# Patient Record
Sex: FEMALE | Race: WHITE | NOT HISPANIC OR LATINO | URBAN - METROPOLITAN AREA
[De-identification: names, ages, dates, MRNs, and addresses within clinical notes are randomized per-mention and may not be internally consistent; named-entity substitution may affect disease eponyms.]

---

## 2019-08-04 ENCOUNTER — EMERGENCY (EMERGENCY)
Facility: HOSPITAL | Age: 34
LOS: 1 days | Discharge: HOME | End: 2019-08-04
Attending: EMERGENCY MEDICINE | Admitting: EMERGENCY MEDICINE
Payer: COMMERCIAL

## 2019-08-04 VITALS
RESPIRATION RATE: 18 BRPM | OXYGEN SATURATION: 97 % | HEART RATE: 84 BPM | TEMPERATURE: 98 F | HEIGHT: 62 IN | SYSTOLIC BLOOD PRESSURE: 122 MMHG | DIASTOLIC BLOOD PRESSURE: 85 MMHG | WEIGHT: 160.06 LBS

## 2019-08-04 DIAGNOSIS — Z98.89 OTHER SPECIFIED POSTPROCEDURAL STATES: Chronic | ICD-10-CM

## 2019-08-04 PROCEDURE — 70450 CT HEAD/BRAIN W/O DYE: CPT | Mod: 26

## 2019-08-04 PROCEDURE — 99284 EMERGENCY DEPT VISIT MOD MDM: CPT

## 2019-08-04 PROCEDURE — 70450 CT HEAD/BRAIN W/O DYE: CPT

## 2019-08-04 PROCEDURE — 99284 EMERGENCY DEPT VISIT MOD MDM: CPT | Mod: 25

## 2019-08-04 NOTE — ED ADULT NURSE REASSESSMENT NOTE - NS ED NURSE REASSESS COMMENT FT1
Steady gait noted leaving the unit.  Patient states her brother is picking her up.  Patient is speaking in clear, full sentences and maintaining saliva.  NAD.  Patient is ok for discharge.

## 2019-08-04 NOTE — ED ADULT NURSE NOTE - CHIEF COMPLAINT QUOTE
BIBEMS after having a couple of drinks and falling from 2 steps down, no LOC, hit her head. Denies any pain

## 2019-08-04 NOTE — ED ADULT NURSE NOTE - OBJECTIVE STATEMENT
Patient with history of asthma and GERD BIBA with c/o fall s/p Patient with history of asthma and GERD BIBA with c/o witnessed fall after having a couple of drinks with one episode of vomiting.  +LOC.  Patient denies CP, SOB, changes in vision, numbness, tingling, difficulty ambulating or neck pain.  Denies pain.

## 2019-08-04 NOTE — ED PROVIDER NOTE - OBJECTIVE STATEMENT
35yo femliu bib ems s/p fall. pt states she was drinking today and lost her balance down a few steps and hit her head, +LOC, witnessed by friends, no dizziness, +abrasions to knee, pt did vomit afterwards, no other compalints

## 2019-10-12 ENCOUNTER — TRANSCRIPTION ENCOUNTER (OUTPATIENT)
Age: 34
End: 2019-10-12

## 2019-12-24 ENCOUNTER — TRANSCRIPTION ENCOUNTER (OUTPATIENT)
Age: 34
End: 2019-12-24

## 2020-12-11 NOTE — ED ADULT TRIAGE NOTE - CHIEF COMPLAINT QUOTE
BIBEMS after having a couple of drinks and falling from 2 steps down, no LOC, hit her head. Denies any pain maintain upright posture during/after eating for 30 mins/oral hygiene/position upright (90 degrees)

## 2021-04-22 ENCOUNTER — TRANSCRIPTION ENCOUNTER (OUTPATIENT)
Age: 36
End: 2021-04-22

## 2021-04-23 ENCOUNTER — RESULT REVIEW (OUTPATIENT)
Age: 36
End: 2021-04-23

## 2021-04-23 ENCOUNTER — INPATIENT (INPATIENT)
Facility: HOSPITAL | Age: 36
LOS: 0 days | Discharge: HOME | DRG: 343 | End: 2021-04-23
Attending: GENERAL PRACTICE | Admitting: GENERAL PRACTICE
Payer: COMMERCIAL

## 2021-04-23 VITALS
RESPIRATION RATE: 15 BRPM | OXYGEN SATURATION: 96 % | HEART RATE: 61 BPM | SYSTOLIC BLOOD PRESSURE: 112 MMHG | DIASTOLIC BLOOD PRESSURE: 70 MMHG

## 2021-04-23 VITALS
TEMPERATURE: 98 F | SYSTOLIC BLOOD PRESSURE: 131 MMHG | HEART RATE: 94 BPM | OXYGEN SATURATION: 98 % | WEIGHT: 158.07 LBS | RESPIRATION RATE: 18 BRPM | HEIGHT: 62 IN | DIASTOLIC BLOOD PRESSURE: 87 MMHG

## 2021-04-23 DIAGNOSIS — Z98.89 OTHER SPECIFIED POSTPROCEDURAL STATES: Chronic | ICD-10-CM

## 2021-04-23 DIAGNOSIS — K37 UNSPECIFIED APPENDICITIS: ICD-10-CM

## 2021-04-23 LAB
ALBUMIN SERPL ELPH-MCNC: 3.7 G/DL — SIGNIFICANT CHANGE UP (ref 3.3–5)
ALP SERPL-CCNC: 55 U/L — SIGNIFICANT CHANGE UP (ref 40–120)
ALT FLD-CCNC: 15 U/L — SIGNIFICANT CHANGE UP (ref 12–78)
ANION GAP SERPL CALC-SCNC: 6 MMOL/L — SIGNIFICANT CHANGE UP (ref 5–17)
APPEARANCE UR: CLEAR — SIGNIFICANT CHANGE UP
APTT BLD: 29.5 SEC — SIGNIFICANT CHANGE UP (ref 27.5–35.5)
AST SERPL-CCNC: 8 U/L — LOW (ref 15–37)
BASOPHILS # BLD AUTO: 0.03 K/UL — SIGNIFICANT CHANGE UP (ref 0–0.2)
BASOPHILS NFR BLD AUTO: 0.3 % — SIGNIFICANT CHANGE UP (ref 0–2)
BILIRUB SERPL-MCNC: 0.4 MG/DL — SIGNIFICANT CHANGE UP (ref 0.2–1.2)
BILIRUB UR-MCNC: NEGATIVE — SIGNIFICANT CHANGE UP
BLD GP AB SCN SERPL QL: SIGNIFICANT CHANGE UP
BUN SERPL-MCNC: 9 MG/DL — SIGNIFICANT CHANGE UP (ref 7–23)
CALCIUM SERPL-MCNC: 8.7 MG/DL — SIGNIFICANT CHANGE UP (ref 8.5–10.1)
CHLORIDE SERPL-SCNC: 105 MMOL/L — SIGNIFICANT CHANGE UP (ref 96–108)
CO2 SERPL-SCNC: 27 MMOL/L — SIGNIFICANT CHANGE UP (ref 22–31)
COLOR SPEC: SIGNIFICANT CHANGE UP
CREAT SERPL-MCNC: 0.61 MG/DL — SIGNIFICANT CHANGE UP (ref 0.5–1.3)
DIFF PNL FLD: NEGATIVE — SIGNIFICANT CHANGE UP
EOSINOPHIL # BLD AUTO: 0.36 K/UL — SIGNIFICANT CHANGE UP (ref 0–0.5)
EOSINOPHIL NFR BLD AUTO: 4.1 % — SIGNIFICANT CHANGE UP (ref 0–6)
GLUCOSE SERPL-MCNC: 92 MG/DL — SIGNIFICANT CHANGE UP (ref 70–99)
GLUCOSE UR QL: NEGATIVE — SIGNIFICANT CHANGE UP
HCG UR QL: NEGATIVE — SIGNIFICANT CHANGE UP
HCT VFR BLD CALC: 36.4 % — SIGNIFICANT CHANGE UP (ref 34.5–45)
HGB BLD-MCNC: 12.7 G/DL — SIGNIFICANT CHANGE UP (ref 11.5–15.5)
IMM GRANULOCYTES NFR BLD AUTO: 0.3 % — SIGNIFICANT CHANGE UP (ref 0–1.5)
INR BLD: 1.11 RATIO — SIGNIFICANT CHANGE UP (ref 0.88–1.16)
KETONES UR-MCNC: NEGATIVE — SIGNIFICANT CHANGE UP
LEUKOCYTE ESTERASE UR-ACNC: NEGATIVE — SIGNIFICANT CHANGE UP
LIDOCAIN IGE QN: 60 U/L — LOW (ref 73–393)
LYMPHOCYTES # BLD AUTO: 1.45 K/UL — SIGNIFICANT CHANGE UP (ref 1–3.3)
LYMPHOCYTES # BLD AUTO: 16.6 % — SIGNIFICANT CHANGE UP (ref 13–44)
MCHC RBC-ENTMCNC: 30.8 PG — SIGNIFICANT CHANGE UP (ref 27–34)
MCHC RBC-ENTMCNC: 34.9 GM/DL — SIGNIFICANT CHANGE UP (ref 32–36)
MCV RBC AUTO: 88.3 FL — SIGNIFICANT CHANGE UP (ref 80–100)
MONOCYTES # BLD AUTO: 0.96 K/UL — HIGH (ref 0–0.9)
MONOCYTES NFR BLD AUTO: 11 % — SIGNIFICANT CHANGE UP (ref 2–14)
NEUTROPHILS # BLD AUTO: 5.88 K/UL — SIGNIFICANT CHANGE UP (ref 1.8–7.4)
NEUTROPHILS NFR BLD AUTO: 67.7 % — SIGNIFICANT CHANGE UP (ref 43–77)
NITRITE UR-MCNC: NEGATIVE — SIGNIFICANT CHANGE UP
NRBC # BLD: 0 /100 WBCS — SIGNIFICANT CHANGE UP (ref 0–0)
PH UR: 6 — SIGNIFICANT CHANGE UP (ref 5–8)
PLATELET # BLD AUTO: 279 K/UL — SIGNIFICANT CHANGE UP (ref 150–400)
POTASSIUM SERPL-MCNC: 4 MMOL/L — SIGNIFICANT CHANGE UP (ref 3.5–5.3)
POTASSIUM SERPL-SCNC: 4 MMOL/L — SIGNIFICANT CHANGE UP (ref 3.5–5.3)
PROT SERPL-MCNC: 7.8 G/DL — SIGNIFICANT CHANGE UP (ref 6–8.3)
PROT UR-MCNC: NEGATIVE — SIGNIFICANT CHANGE UP
PROTHROM AB SERPL-ACNC: 12.9 SEC — SIGNIFICANT CHANGE UP (ref 10.6–13.6)
RBC # BLD: 4.12 M/UL — SIGNIFICANT CHANGE UP (ref 3.8–5.2)
RBC # FLD: 12.3 % — SIGNIFICANT CHANGE UP (ref 10.3–14.5)
SARS-COV-2 RNA SPEC QL NAA+PROBE: SIGNIFICANT CHANGE UP
SODIUM SERPL-SCNC: 138 MMOL/L — SIGNIFICANT CHANGE UP (ref 135–145)
SP GR SPEC: 1.01 — SIGNIFICANT CHANGE UP (ref 1.01–1.02)
UROBILINOGEN FLD QL: NEGATIVE — SIGNIFICANT CHANGE UP
WBC # BLD: 8.71 K/UL — SIGNIFICANT CHANGE UP (ref 3.8–10.5)
WBC # FLD AUTO: 8.71 K/UL — SIGNIFICANT CHANGE UP (ref 3.8–10.5)

## 2021-04-23 PROCEDURE — 36415 COLL VENOUS BLD VENIPUNCTURE: CPT

## 2021-04-23 PROCEDURE — 88304 TISSUE EXAM BY PATHOLOGIST: CPT | Mod: 26

## 2021-04-23 PROCEDURE — 81025 URINE PREGNANCY TEST: CPT

## 2021-04-23 PROCEDURE — 99285 EMERGENCY DEPT VISIT HI MDM: CPT

## 2021-04-23 PROCEDURE — 86900 BLOOD TYPING SEROLOGIC ABO: CPT

## 2021-04-23 PROCEDURE — 74177 CT ABD & PELVIS W/CONTRAST: CPT

## 2021-04-23 PROCEDURE — U0003: CPT

## 2021-04-23 PROCEDURE — 85610 PROTHROMBIN TIME: CPT

## 2021-04-23 PROCEDURE — C1889: CPT

## 2021-04-23 PROCEDURE — U0005: CPT

## 2021-04-23 PROCEDURE — 85730 THROMBOPLASTIN TIME PARTIAL: CPT

## 2021-04-23 PROCEDURE — 81003 URINALYSIS AUTO W/O SCOPE: CPT

## 2021-04-23 PROCEDURE — 86901 BLOOD TYPING SEROLOGIC RH(D): CPT

## 2021-04-23 PROCEDURE — 74177 CT ABD & PELVIS W/CONTRAST: CPT | Mod: 26,MA

## 2021-04-23 PROCEDURE — 83690 ASSAY OF LIPASE: CPT

## 2021-04-23 PROCEDURE — 85025 COMPLETE CBC W/AUTO DIFF WBC: CPT

## 2021-04-23 PROCEDURE — 96374 THER/PROPH/DIAG INJ IV PUSH: CPT

## 2021-04-23 PROCEDURE — 86850 RBC ANTIBODY SCREEN: CPT

## 2021-04-23 PROCEDURE — 88304 TISSUE EXAM BY PATHOLOGIST: CPT

## 2021-04-23 PROCEDURE — 80053 COMPREHEN METABOLIC PANEL: CPT

## 2021-04-23 RX ORDER — METOCLOPRAMIDE HCL 10 MG
10 TABLET ORAL ONCE
Refills: 0 | Status: COMPLETED | OUTPATIENT
Start: 2021-04-23 | End: 2021-04-23

## 2021-04-23 RX ORDER — HYDROMORPHONE HYDROCHLORIDE 2 MG/ML
1 INJECTION INTRAMUSCULAR; INTRAVENOUS; SUBCUTANEOUS
Refills: 0 | Status: DISCONTINUED | OUTPATIENT
Start: 2021-04-23 | End: 2021-04-23

## 2021-04-23 RX ORDER — SODIUM CHLORIDE 9 MG/ML
1000 INJECTION, SOLUTION INTRAVENOUS
Refills: 0 | Status: DISCONTINUED | OUTPATIENT
Start: 2021-04-23 | End: 2021-04-23

## 2021-04-23 RX ORDER — DIPHENHYDRAMINE HCL 50 MG
25 CAPSULE ORAL ONCE
Refills: 0 | Status: COMPLETED | OUTPATIENT
Start: 2021-04-23 | End: 2021-04-23

## 2021-04-23 RX ORDER — HYDROMORPHONE HYDROCHLORIDE 2 MG/ML
0.5 INJECTION INTRAMUSCULAR; INTRAVENOUS; SUBCUTANEOUS
Refills: 0 | Status: DISCONTINUED | OUTPATIENT
Start: 2021-04-23 | End: 2021-04-23

## 2021-04-23 RX ORDER — FAMOTIDINE 10 MG/ML
20 INJECTION INTRAVENOUS ONCE
Refills: 0 | Status: COMPLETED | OUTPATIENT
Start: 2021-04-23 | End: 2021-04-23

## 2021-04-23 RX ORDER — ONDANSETRON 8 MG/1
4 TABLET, FILM COATED ORAL ONCE
Refills: 0 | Status: DISCONTINUED | OUTPATIENT
Start: 2021-04-23 | End: 2021-04-23

## 2021-04-23 RX ORDER — SODIUM CHLORIDE 9 MG/ML
500 INJECTION, SOLUTION INTRAVENOUS
Refills: 0 | Status: DISCONTINUED | OUTPATIENT
Start: 2021-04-23 | End: 2021-04-23

## 2021-04-23 RX ORDER — SODIUM CHLORIDE 9 MG/ML
1000 INJECTION INTRAMUSCULAR; INTRAVENOUS; SUBCUTANEOUS ONCE
Refills: 0 | Status: COMPLETED | OUTPATIENT
Start: 2021-04-23 | End: 2021-04-23

## 2021-04-23 RX ORDER — MORPHINE SULFATE 50 MG/1
2 CAPSULE, EXTENDED RELEASE ORAL ONCE
Refills: 0 | Status: DISCONTINUED | OUTPATIENT
Start: 2021-04-23 | End: 2021-04-23

## 2021-04-23 RX ORDER — APREPITANT 80 MG/1
40 CAPSULE ORAL ONCE
Refills: 0 | Status: DISCONTINUED | OUTPATIENT
Start: 2021-04-23 | End: 2021-04-23

## 2021-04-23 RX ORDER — MORPHINE SULFATE 50 MG/1
4 CAPSULE, EXTENDED RELEASE ORAL ONCE
Refills: 0 | Status: DISCONTINUED | OUTPATIENT
Start: 2021-04-23 | End: 2021-04-23

## 2021-04-23 RX ORDER — CEFOTETAN DISODIUM 1 G
2 VIAL (EA) INJECTION ONCE
Refills: 0 | Status: COMPLETED | OUTPATIENT
Start: 2021-04-23 | End: 2021-04-23

## 2021-04-23 RX ORDER — IBUPROFEN 200 MG
400 TABLET ORAL EVERY 8 HOURS
Refills: 0 | Status: DISCONTINUED | OUTPATIENT
Start: 2021-04-23 | End: 2021-04-23

## 2021-04-23 RX ADMIN — MORPHINE SULFATE 2 MILLIGRAM(S): 50 CAPSULE, EXTENDED RELEASE ORAL at 14:49

## 2021-04-23 RX ADMIN — Medication 30 MILLILITER(S): at 08:43

## 2021-04-23 RX ADMIN — SODIUM CHLORIDE 1000 MILLILITER(S): 9 INJECTION INTRAMUSCULAR; INTRAVENOUS; SUBCUTANEOUS at 13:04

## 2021-04-23 RX ADMIN — SODIUM CHLORIDE 1000 MILLILITER(S): 9 INJECTION INTRAMUSCULAR; INTRAVENOUS; SUBCUTANEOUS at 08:44

## 2021-04-23 RX ADMIN — FAMOTIDINE 20 MILLIGRAM(S): 10 INJECTION INTRAVENOUS at 08:43

## 2021-04-23 RX ADMIN — Medication 25 MILLIGRAM(S): at 15:10

## 2021-04-23 RX ADMIN — MORPHINE SULFATE 4 MILLIGRAM(S): 50 CAPSULE, EXTENDED RELEASE ORAL at 13:04

## 2021-04-23 RX ADMIN — Medication 100 GRAM(S): at 14:50

## 2021-04-23 RX ADMIN — Medication 10 MILLIGRAM(S): at 21:20

## 2021-04-23 NOTE — ED PROVIDER NOTE - PHYSICAL EXAMINATION
General:  Comfortable, no acute distress.  Head:  Normocephalic.  Eyes:  Conjunctiva pink, no icterus.  Ear, Nose, Mouth, Throat:  No obvious congestion.  Cardiovascular:  Regular rate, no obvious murmur.  Respiratory:  Clear to auscultation with good air entry bilaterally.  Abdomen:  Soft, non-distended. No rebound, Richardson's negative. + McBurney's. voluntary guarding.  Urologic: No CVA tenderness.  Musculoskeletal:  No deformity, edema, or calf tenderness.  Neurologic: Alert and oriented, gross motor and sensory intact.  Skin:  Warm and dry.  Psychiatric: Normal affect.

## 2021-04-23 NOTE — ED ADULT NURSE NOTE - NSIMPLEMENTINTERV_GEN_ALL_ED
Implemented All Universal Safety Interventions:  [place] to call system. Call bell, personal items and telephone within reach. Instruct patient to call for assistance. Room bathroom lighting operational. Non-slip footwear when patient is off stretcher. Physically safe environment: no spills, clutter or unnecessary equipment. Stretcher in lowest position, wheels locked, appropriate side rails in place.

## 2021-04-23 NOTE — ED PROVIDER NOTE - OBJECTIVE STATEMENT
36 F no PMH here complaining of abdominal pain, started a few days ago and has been worsening. Started in upper abdomen, now in lower, pain is sharp like punching sensation. No nausea, vomiting, fevers, chills, constipation, diarrhea, dysuria. nothing taken prior to arrival, no hx of similar symptoms. no inciting event.

## 2021-04-23 NOTE — H&P ADULT - HISTORY OF PRESENT ILLNESS
35 yo fem c/o diffuse abdominal pain which started 3 days ago, her pain migrated to RLQ last night, pain 8/10, not relieved by anything, decreased appetite, no fever. CT Abd showed thickened appendix consistent with appendicitis.

## 2021-04-23 NOTE — ED ADULT NURSE REASSESSMENT NOTE - NS ED NURSE REASSESS COMMENT FT1
Patient had allergic reaction to the ABX CefoTtan ppt had hives and itching on chest patient denies swelling of throat or breathing difficulties MD made aware Benadryl give IVP will continue to monitor

## 2021-04-23 NOTE — CONSULT NOTE ADULT - SUBJECTIVE AND OBJECTIVE BOX
[facility]  [place] [address] [phone]      Chief Complaint:  Patient is a 36y old  Female who presents with a chief complaint of     HPI:36 F no PMH here complaining of abdominal pain, started a few days ago and has been worsening. Started in upper abdomen, now in lower, pain is sharp like punching sensation. No nausea, vomiting, fevers, chills, constipation, diarrhea, dysuria. nothing taken prior to arrival, no hx of similar symptoms. no inciting event    Allergies:  No Known Allergies      Medications: reviewed      PMHX/PSHX:  GERD (gastroesophageal reflux disease)    Asthma    S/P tonsillectomy        Family history:  htn    Social History: no toxic habits     ROS:     General:  No wt loss, fevers, chills, night sweats, fatigue,   Eyes:  Good vision, no reported pain  ENT:  No sore throat, pain, runny nose, dysphagia  CV:  No pain, palpitations, hypo/hypertension  Resp:  No dyspnea, cough, tachypnea, wheezing  GI:  + pain, No nausea, No vomiting, No diarrhea, No constipation, No weight loss, No fever, No pruritis, No rectal bleeding, No tarry stools, No dysphagia,  :  No pain, bleeding, incontinence, nocturia  Muscle:  No pain, weakness  Neuro:  No weakness, tingling, memory problems  Psych:  No fatigue, insomnia, mood problems, depression  Endocrine:  No polyuria, polydipsia, cold/heat intolerance  Heme:  No petechiae, ecchymosis, easy bruisability  Skin:  No rash, tattoos, scars, edema      PHYSICAL EXAM:   Vital Signs:  Vital Signs Last 24 Hrs  T(C): 37.2 (2021 11:10), Max: 37.2 (2021 11:10)  T(F): 99 (2021 11:10), Max: 99 (2021 11:10)  HR: 81 (2021 11:10) (81 - 94)  BP: 123/79 (2021 11:10) (123/79 - 131/87)  BP(mean): --  RR: 16 (2021 11:10) (16 - 18)  SpO2: 100% (2021 11:10) (98% - 100%)  Daily Height in cm: 157.48 (2021 08:12)    Daily     GENERAL:  Appears stated age,   HEENT:  NC/AT,    CHEST:  Full & symmetric excursion,   HEART:  Regular rhythm  ABDOMEN:  Soft, non-tender, non-distended,   EXTEREMITIES:  no cyanosis,clubbing or edema  SKIN:  No rash  NEURO:  Alert,    LABS:                        12.7   8.71  )-----------( 279      ( 2021 08:40 )             36.4     04    138  |  105  |  9   ----------------------------<  92  4.0   |  27  |  0.61    Ca    8.7      2021 08:40    TPro  7.8  /  Alb  3.7  /  TBili  0.4  /  DBili  x   /  AST  8<L>  /  ALT  15  /  AlkPhos  55  0423    LIVER FUNCTIONS - ( 2021 08:40 )  Alb: 3.7 g/dL / Pro: 7.8 g/dL / ALK PHOS: 55 U/L / ALT: 15 U/L / AST: 8 U/L / GGT: x             Urinalysis Basic - ( 2021 09:25 )    Color: Pale Yellow / Appearance: Clear / S.010 / pH: x  Gluc: x / Ketone: Negative  / Bili: Negative / Urobili: Negative   Blood: x / Protein: Negative / Nitrite: Negative   Leuk Esterase: Negative / RBC: x / WBC x   Sq Epi: x / Non Sq Epi: x / Bacteria: x      Amylase Serum--      Lipase serum60       Ammonia--      Imaging:

## 2021-04-23 NOTE — H&P ADULT - NSHPREVIEWOFSYSTEMS_GEN_ALL_CORE
REVIEW OF SYSTEMS:    CONSTITUTIONAL: No weakness, fevers or chills  EYES/ENT: No visual changes;  No vertigo or throat pain   NECK: No pain or stiffness  RESPIRATORY: No cough, wheezing, hemoptysis; No shortness of breath  CARDIOVASCULAR: No chest pain or palpitations  GASTROINTESTINAL: abdominal  pain. No nausea, vomiting, or hematemesis; No diarrhea or constipation. No melena or hematochezia.  GENITOURINARY: No dysuria, frequency or hematuria  NEUROLOGICAL: No numbness or weakness  SKIN: No itching, burning, rashes, or lesions   All other review of systems is negative unless indicated above.

## 2021-04-23 NOTE — ED PROVIDER NOTE - PROGRESS NOTE DETAILS
patient reports pain now more intense, localizing more in right lower quadrant. CT with concern for early appy. Dr. Pichardo called. Spoke with Dr. Picharod, he recommends admission to his service, he will decide on antibiotics later.

## 2021-04-23 NOTE — H&P ADULT - NSHPLABSRESULTS_GEN_ALL_CORE
Vital Signs Last 24 Hrs  T(C): 37.2 (23 Apr 2021 11:10), Max: 37.2 (23 Apr 2021 11:10)  T(F): 99 (23 Apr 2021 11:10), Max: 99 (23 Apr 2021 11:10)  HR: 81 (23 Apr 2021 11:10) (81 - 94)  BP: 123/79 (23 Apr 2021 11:10) (123/79 - 131/87)  BP(mean): --  RR: 16 (23 Apr 2021 11:10) (16 - 18)  SpO2: 100% (23 Apr 2021 11:10) (98% - 100%)                          12.7   8.71  )-----------( 279      ( 23 Apr 2021 08:40 )             36.4       04-23    138  |  105  |  9   ----------------------------<  92  4.0   |  27  |  0.61    Ca    8.7      23 Apr 2021 08:40    TPro  7.8  /  Alb  3.7  /  TBili  0.4  /  DBili  x   /  AST  8<L>  /  ALT  15  /  AlkPhos  55  04-23      LIVER FUNCTIONS - ( 23 Apr 2021 08:40 )  Alb: 3.7 g/dL / Pro: 7.8 g/dL / ALK PHOS: 55 U/L / ALT: 15 U/L / AST: 8 U/L / GGT: x             MEDICATIONS  (STANDING):    MEDICATIONS  (PRN):      MEDICATIONS  (STANDING):      < from: CT Abdomen and Pelvis w/ IV Cont (04.23.21 @ 11:56) >    FINDINGS:  LOWER CHEST: A few small lung nodules bilaterally with the largest measuring up to 5 mm in the right lower lobe (image 18 series 2). If the patient is in the high risk category (i.e. smoker), follow-up chest CT may be pursued in 12 months to ensure stability.    LIVER: Small hypodense lesion in the right hepatic lobe, too small to characterize.  BILE DUCTS: Normal caliber.  GALLBLADDER: Within normal limits.  SPLEEN: Within normal limits.  PANCREAS: Within normal limits.  ADRENALS: Within normal limits.  KIDNEYS/URETERS: Unremarkable except for a small hypodense lesion in the right kidney, too small to characterize.    BLADDER: Within normal limits.  REPRODUCTIVE ORGANS: The uterus appears grossly unremarkable. 2.9 cm hypodense structure in the left adnexa may represent a complex left ovarian cyst. If clinically indicated, pelvic ultrasound may be pursued for further evaluation.    BOWEL: No bowel obstruction. Appendix measures 8 mm in diameter which is slightly thickened without adjacent stranding. Findings are equivocal for acute appendicitis.  PERITONEUM: No free air or ascites.  VESSELS: Within normal limits.  RETROPERITONEUM/LYMPH NODES: No lymphadenopathy.  ABDOMINAL WALL: Within normal limits.  BONES: Within normal limits.    IMPRESSION:  Appendix measures 8 mm in diameter which is slightly thickened without adjacent stranding. Findings are equivocal for acute appendicitis. Clinical correlation is recommended.    2.9 cm hypodense structure in the left adnexa may represent a complex left ovarian cyst. If clinically indicated, pelvic ultrasound may be pursued for further evaluation.    A few small lung nodules bilaterally. If the patient is in the high risk category (i.e. smoker), follow-up chest CT may be pursued in 12 months to ensure stability.    < end of copied text >

## 2021-04-23 NOTE — BRIEF OPERATIVE NOTE - NSICDXBRIEFPREOP_GEN_ALL_CORE_FT
PRE-OP DIAGNOSIS:  Acute appendicitis with localized peritonitis 23-Apr-2021 20:49:50  Eusebio Schwartz

## 2021-04-23 NOTE — H&P ADULT - NSHPPHYSICALEXAM_GEN_ALL_CORE
.  VITAL SIGNS:  T(C): 37.2 (04-23-21 @ 11:10), Max: 37.2 (04-23-21 @ 11:10)  T(F): 99 (04-23-21 @ 11:10), Max: 99 (04-23-21 @ 11:10)  HR: 81 (04-23-21 @ 11:10) (81 - 94)  BP: 123/79 (04-23-21 @ 11:10) (123/79 - 131/87)  BP(mean): --  RR: 16 (04-23-21 @ 11:10) (16 - 18)  SpO2: 100% (04-23-21 @ 11:10) (98% - 100%)  Wt(kg): --    PHYSICAL EXAM:    Constitutional: resting comfortably in bed; NAD  Eyes: PERRL, EOMI, anicteric sclera  ENT: no nasal discharge; uvula midline, no oropharyngeal erythema or exudates  Neck: supple; no JVD or thyromegaly  Respiratory: CTA B/L; no W/R/R, no retractions  Cardiac: +S1/S2; RRR; no murmurs  Gastrointestinal: RLQ tenderness, rebound  Back: spine midline, no bony tenderness or step-offs; no CVAT B/L  Extremities: no clubbing or cyanosis; no peripheral edema  Musculoskeletal: NROM x4; no joint swelling, tenderness or erythema  Vascular: 2+ radial, femoral, DP/PT pulses B/L  Dermatologic: skin warm, dry and intact; no rashes, wounds, or scars  Lymphatic: no submandibular or cervical LAD  Neurologic: AAOx3; CNII-XII grossly intact; no focal deficits  Psychiatric: affect and characteristics of appearance, verbalizations, behaviors are appropriate

## 2021-04-23 NOTE — ASU DISCHARGE PLAN (ADULT/PEDIATRIC) - CARE PROVIDER_API CALL
Eusebio Schwartz)  ColonRectal Surgery; Surgery  [address] [place] [address]  Phone: [phone]  Fax: [phone]  Follow Up Time:

## 2021-04-23 NOTE — BRIEF OPERATIVE NOTE - NSICDXBRIEFPOSTOP_GEN_ALL_CORE_FT
POST-OP DIAGNOSIS:  Acute appendicitis with localized peritonitis 23-Apr-2021 20:49:58  Eusebio Schwartz

## 2021-04-23 NOTE — CONSULT NOTE ADULT - ASSESSMENT
abdominal pain  abnormal CT    NPO  iv fluid  CT reviewed ? early appendicitis  it correlates with her symptoms  surgical eval  d/w ER

## 2021-04-23 NOTE — ED ADULT NURSE NOTE - OBJECTIVE STATEMENT
Patient is a 35yo female complaining of right lower abdominal pain since Tuesday Patient denies nausea vomiting diarrhea. Patient states that she had this pain before and it was due to gas, but this time it feel more intense. Patient denies GI or  PMH

## 2022-07-28 ENCOUNTER — EMERGENCY (EMERGENCY)
Facility: HOSPITAL | Age: 37
LOS: 1 days | Discharge: HOME | End: 2022-07-28
Attending: STUDENT IN AN ORGANIZED HEALTH CARE EDUCATION/TRAINING PROGRAM | Admitting: STUDENT IN AN ORGANIZED HEALTH CARE EDUCATION/TRAINING PROGRAM

## 2022-07-28 VITALS
TEMPERATURE: 97 F | RESPIRATION RATE: 18 BRPM | SYSTOLIC BLOOD PRESSURE: 132 MMHG | DIASTOLIC BLOOD PRESSURE: 79 MMHG | HEART RATE: 89 BPM | OXYGEN SATURATION: 100 % | HEIGHT: 62 IN

## 2022-07-28 VITALS
SYSTOLIC BLOOD PRESSURE: 123 MMHG | OXYGEN SATURATION: 100 % | TEMPERATURE: 98 F | DIASTOLIC BLOOD PRESSURE: 78 MMHG | HEART RATE: 88 BPM | RESPIRATION RATE: 16 BRPM

## 2022-07-28 DIAGNOSIS — Z90.49 ACQUIRED ABSENCE OF OTHER SPECIFIED PARTS OF DIGESTIVE TRACT: Chronic | ICD-10-CM

## 2022-07-28 DIAGNOSIS — Z98.89 OTHER SPECIFIED POSTPROCEDURAL STATES: Chronic | ICD-10-CM

## 2022-07-28 LAB
ALBUMIN SERPL ELPH-MCNC: 3.8 G/DL — SIGNIFICANT CHANGE UP (ref 3.3–5)
ALP SERPL-CCNC: 48 U/L — SIGNIFICANT CHANGE UP (ref 40–120)
ALT FLD-CCNC: 6 U/L — SIGNIFICANT CHANGE UP (ref 4–33)
ANION GAP SERPL CALC-SCNC: 9 MMOL/L — SIGNIFICANT CHANGE UP (ref 7–14)
APPEARANCE UR: ABNORMAL
AST SERPL-CCNC: 13 U/L — SIGNIFICANT CHANGE UP (ref 4–32)
BACTERIA # UR AUTO: ABNORMAL
BASOPHILS # BLD AUTO: 0.02 K/UL — SIGNIFICANT CHANGE UP (ref 0–0.2)
BASOPHILS NFR BLD AUTO: 0.3 % — SIGNIFICANT CHANGE UP (ref 0–2)
BILIRUB SERPL-MCNC: <0.2 MG/DL — SIGNIFICANT CHANGE UP (ref 0.2–1.2)
BILIRUB UR-MCNC: NEGATIVE — SIGNIFICANT CHANGE UP
BUN SERPL-MCNC: 5 MG/DL — LOW (ref 7–23)
CALCIUM SERPL-MCNC: 9.3 MG/DL — SIGNIFICANT CHANGE UP (ref 8.4–10.5)
CHLORIDE SERPL-SCNC: 103 MMOL/L — SIGNIFICANT CHANGE UP (ref 98–107)
CO2 SERPL-SCNC: 24 MMOL/L — SIGNIFICANT CHANGE UP (ref 22–31)
COLOR SPEC: COLORLESS — SIGNIFICANT CHANGE UP
CREAT SERPL-MCNC: 0.46 MG/DL — LOW (ref 0.5–1.3)
DIFF PNL FLD: NEGATIVE — SIGNIFICANT CHANGE UP
EGFR: 126 ML/MIN/1.73M2 — SIGNIFICANT CHANGE UP
EOSINOPHIL # BLD AUTO: 0.26 K/UL — SIGNIFICANT CHANGE UP (ref 0–0.5)
EOSINOPHIL NFR BLD AUTO: 3.8 % — SIGNIFICANT CHANGE UP (ref 0–6)
EPI CELLS # UR: ABNORMAL
GLUCOSE SERPL-MCNC: 97 MG/DL — SIGNIFICANT CHANGE UP (ref 70–99)
GLUCOSE UR QL: ABNORMAL
HCT VFR BLD CALC: 34.2 % — LOW (ref 34.5–45)
HGB BLD-MCNC: 11.7 G/DL — SIGNIFICANT CHANGE UP (ref 11.5–15.5)
HYALINE CASTS # UR AUTO: 0 /LPF — SIGNIFICANT CHANGE UP (ref 0–7)
IANC: 4.34 K/UL — SIGNIFICANT CHANGE UP (ref 1.8–7.4)
IMM GRANULOCYTES NFR BLD AUTO: 0.3 % — SIGNIFICANT CHANGE UP (ref 0–1.5)
KETONES UR-MCNC: NEGATIVE — SIGNIFICANT CHANGE UP
LEUKOCYTE ESTERASE UR-ACNC: ABNORMAL
LYMPHOCYTES # BLD AUTO: 1.65 K/UL — SIGNIFICANT CHANGE UP (ref 1–3.3)
LYMPHOCYTES # BLD AUTO: 23.8 % — SIGNIFICANT CHANGE UP (ref 13–44)
MCHC RBC-ENTMCNC: 30.5 PG — SIGNIFICANT CHANGE UP (ref 27–34)
MCHC RBC-ENTMCNC: 34.2 GM/DL — SIGNIFICANT CHANGE UP (ref 32–36)
MCV RBC AUTO: 89.3 FL — SIGNIFICANT CHANGE UP (ref 80–100)
MONOCYTES # BLD AUTO: 0.64 K/UL — SIGNIFICANT CHANGE UP (ref 0–0.9)
MONOCYTES NFR BLD AUTO: 9.2 % — SIGNIFICANT CHANGE UP (ref 2–14)
NEUTROPHILS # BLD AUTO: 4.34 K/UL — SIGNIFICANT CHANGE UP (ref 1.8–7.4)
NEUTROPHILS NFR BLD AUTO: 62.6 % — SIGNIFICANT CHANGE UP (ref 43–77)
NITRITE UR-MCNC: NEGATIVE — SIGNIFICANT CHANGE UP
NRBC # BLD: 0 /100 WBCS — SIGNIFICANT CHANGE UP
NRBC # FLD: 0 K/UL — SIGNIFICANT CHANGE UP
PH UR: 7.5 — SIGNIFICANT CHANGE UP (ref 5–8)
PLATELET # BLD AUTO: 241 K/UL — SIGNIFICANT CHANGE UP (ref 150–400)
POTASSIUM SERPL-MCNC: 4 MMOL/L — SIGNIFICANT CHANGE UP (ref 3.5–5.3)
POTASSIUM SERPL-SCNC: 4 MMOL/L — SIGNIFICANT CHANGE UP (ref 3.5–5.3)
PROT SERPL-MCNC: 7 G/DL — SIGNIFICANT CHANGE UP (ref 6–8.3)
PROT UR-MCNC: ABNORMAL
RBC # BLD: 3.83 M/UL — SIGNIFICANT CHANGE UP (ref 3.8–5.2)
RBC # FLD: 12.1 % — SIGNIFICANT CHANGE UP (ref 10.3–14.5)
RBC CASTS # UR COMP ASSIST: 2 /HPF — SIGNIFICANT CHANGE UP (ref 0–4)
SODIUM SERPL-SCNC: 136 MMOL/L — SIGNIFICANT CHANGE UP (ref 135–145)
SP GR SPEC: 1.01 — SIGNIFICANT CHANGE UP (ref 1–1.05)
UROBILINOGEN FLD QL: SIGNIFICANT CHANGE UP
WBC # BLD: 6.93 K/UL — SIGNIFICANT CHANGE UP (ref 3.8–10.5)
WBC # FLD AUTO: 6.93 K/UL — SIGNIFICANT CHANGE UP (ref 3.8–10.5)
WBC UR QL: 20 /HPF — SIGNIFICANT CHANGE UP (ref 0–5)

## 2022-07-28 PROCEDURE — 99284 EMERGENCY DEPT VISIT MOD MDM: CPT

## 2022-07-28 RX ORDER — NITROFURANTOIN MACROCRYSTAL 50 MG
1 CAPSULE ORAL
Qty: 10 | Refills: 0
Start: 2022-07-28 | End: 2022-08-01

## 2022-07-28 RX ORDER — MULTIVIT WITH MIN/MFOLATE/K2 340-15/3 G
1 POWDER (GRAM) ORAL ONCE
Refills: 0 | Status: COMPLETED | OUTPATIENT
Start: 2022-07-28 | End: 2022-07-28

## 2022-07-28 RX ORDER — NITROFURANTOIN MACROCRYSTAL 50 MG
100 CAPSULE ORAL ONCE
Refills: 0 | Status: COMPLETED | OUTPATIENT
Start: 2022-07-28 | End: 2022-07-28

## 2022-07-28 RX ADMIN — Medication 100 MILLIGRAM(S): at 18:11

## 2022-07-28 RX ADMIN — Medication 1 ENEMA: at 14:58

## 2022-07-28 RX ADMIN — Medication 1 BOTTLE: at 13:11

## 2022-07-28 NOTE — ED PROVIDER NOTE - PROGRESS NOTE DETAILS
GWENDOLYN Gutiérrez NP:  Pt received the enema and has been able to have a bowel movement.  She reports feeling a  bit clammy after but also states she feels relief and overall better.  She states she is waiting to pass urine as she has had difficulty with urinating.  Provided oral hydration to facilitate urination. GWENDOLYN Gutiérrez NP: UA results indicate possible UTI.  Pt informed of results and plan d/w pt.  Pt still having difficulty urinating.  Repeat bladder scan revealed ~600cc urine in bladder.  Will plan to straight cath patient and administer first dose of abx in ED.  Pt has appt with her OB/GYN tomorrow so will d/c to f/u w/ her OB.  All questions answered.  Pt stable and ready for d/c. Offered lopez - declines. States that she has issues with retention but is able to pee in the bathtub during this pregnancy. On med review no medications that would cause retention. Will DC w follow up tomorrow am. Gilmar Gupta, ED Attending GWENDOLYN Gutiérrez NP: Per RN, pt was straight cath - 800cc urine drained.  Pt once again offered lopez but declined.  Pt has appt with her OB/GYN tomorrow so will d/c to f/u w/ her OB.  All questions answered.  Pt stable and ready for d/c. GWENDOLYN Gutiérrez NP: UA results indicate possible UTI.  Pt informed of results and plan d/w pt.  Pt still having difficulty urinating.  Repeat bladder scan revealed ~600cc urine in bladder.  Will plan to straight cath patient and administer first dose of abx in ED prior to D/C

## 2022-07-28 NOTE — ED PROVIDER NOTE - CARE PLAN
1 Principal Discharge DX:	Constipation, acute  Secondary Diagnosis:	Acute UTI  Secondary Diagnosis:	Acute urinary retention

## 2022-07-28 NOTE — ED PROVIDER NOTE - PHYSICAL EXAMINATION
GEN: Pt in NAD, A&O x4  PSYCH: Affect appropriate.  EYES: Sclera white w/o injection.   ENT: Head NCAT. MMM. Neck supple FROM.  RESP: CTA b/l, no wheezes, rales, or rhonchi.   CARDIAC: RRR, clear distinct S1, S2.  FETAL HR: 150 bpm (via doppler)  ABD: Abdomen soft, mild distension w/ discomfort on palpation.  VASC: 2+ radial and dorsalis pedis pulses b/l. No edema or calf tenderness.  SKIN: No rashes on the trunk.

## 2022-07-28 NOTE — ED PROVIDER NOTE - NSFOLLOWUPINSTRUCTIONS_ED_ALL_ED_FT
You were seen in the emergency department for constipation. Your discharge diagnosis is constipation r/t pregnancy, urinary tract infection, and urinary retention, acute.  Please read all attached patient information, read all additional instructions below, and follow-up with all providers as directed.    1) Follow-up with your OB/GYN at your scheduled appt tomorrow.  Be sure to let her know you were seen in the ED for constipation, UTI, and urinary retention.    2) Continue to take all medications as prescribed.  Finish all of the prescribed antibiotics, even if you begin to feel better.    3) Rest and stay hydrated.  Read the information below about your diagnosed conditions.    4) Return to the ER for any new or worsening symptoms.      Please read all attached patient information.    Urinary Tract Infection    A urinary tract infection (UTI) is an infection of any part of the urinary tract, which includes the kidneys, ureters, bladder, and urethra. Risk factors include ignoring your need to urinate, wiping back to front if female, being an uncircumcised male, and having diabetes or a weak immune system. Symptoms include frequent urination, pain or burning with urination, foul smelling urine, cloudy urine, pain in the lower abdomen, blood in the urine, and fever. If you were prescribed an antibiotic medicine, take it as told by your health care provider. Do not stop taking the antibiotic even if you start to feel better.    SEEK IMMEDIATE MEDICAL CARE IF YOU HAVE THE FOLLOWING SYMPTOMS: severe back or abdominal pain, inability to keep fluids or medicine down, dizziness/lightheadedness, or a change in mental status.      Constipation    Constipation is when a person has fewer than three bowel movements a week, has difficulty having a bowel movement, or has stools that are dry, hard, or larger than normal. Other symptoms can include abdominal pain or bloating. As people grow older, constipation is more common. A low-fiber diet, not taking in enough fluids, and taking certain medicines may make constipation worse. Treatment varies but may include dietary modifications (more fiber-rich foods), lifestyle modifications, and possible medications.     SEEK IMMEDIATE MEDICAL CARE IF YOU HAVE THE FOLLOWING SYMPTOMS: bright red blood in your stool, constipation for longer than 4 days, abdominal or rectal pain, unexplained weight loss, or inability to pass gas.

## 2022-07-28 NOTE — ED PROVIDER NOTE - CLINICAL SUMMARY MEDICAL DECISION MAKING FREE TEXT BOX
37 y.o. female, , currently 13 weeks pregnant presenting with c/o constipation x 4 days.  Has tried multiple pharmacologic agents w/o relief.  Also endorsing difficulty urinating and abdominal distension.  Based on HPI, ROS, and exam feel sx likely r/t constipation from pregnancy.  Will obtain labs for fluid/electrolyte eval and UA to evaluate for UTI.  Plan for enema and oral lax to relieve constipation and then reassess.

## 2022-07-28 NOTE — ED ADULT NURSE NOTE - OBJECTIVE STATEMENT
pt is 13 weeks pregnant, c/o constipation. Took laxataives without relief. Labs sent, enema ordered, continue to monitor

## 2022-07-28 NOTE — ED ADULT TRIAGE NOTE - HEIGHT IN INCHES
Mailed out pt  Paperwork on 1/30/19 pt has still not received peperwork.  She will be by the office to  a copy on 2/8/19 2

## 2022-07-28 NOTE — ED PROVIDER NOTE - PATIENT PORTAL LINK FT
You can access the FollowMyHealth Patient Portal offered by [facility] by registering at the following website: [email or web address]. By joining [facility]’s FollowMyHealth portal, you will also be able to view your health information using other applications (apps) compatible with our system.

## 2022-07-28 NOTE — ED ADULT NURSE NOTE - CHIEF COMPLAINT QUOTE
Pt is 13 weeks pregnant, c/o constipation. LBM on Sunday. Had a GYN checkup on Monday, WNL. Pt states she's been taking laxatives with no effect. C/o abd distention.

## 2022-07-28 NOTE — ED ADULT NURSE REASSESSMENT NOTE - NS ED NURSE REASSESS COMMENT FT1
break nurse covering for primary RN; pt in stretcher. fleet enema provided, pt to self administer. commode at bedside. will reassess.

## 2022-07-28 NOTE — ED PROVIDER NOTE - ATTENDING APP SHARED VISIT CONTRIBUTION OF CARE
I have personally performed a face to face medical and diagnostic evaluation of the patient. I have discussed with and reviewed the ACP's note and agree with the History, ROS, Physical Exam and MDM unless otherwise indicated. A brief summary of my personal evaluation and impression can be found below.    37 y.o. female with PSHx of appendectomy last year, , currently 13 weeks pregnant, present to the ED with c/o constipation x 4 days.  She states last bowel movement was  AM and was hard and small.  Has not had prior issues with constipation prior to this.  Saw her OB/GYN on Mon. and was advised to go to ED if she still has not had a bowel movement.  She states she has tried metamucil, mirolax, coalace stool softener and coalace lax without any relief.  She states she is also now experiencing difficulty urinating as well.  Pt states she feels distended and very uncomfortable.  She has the urge to urinate but unable to.  She denies any fever, chills, nausea, vomiting, diarrhea, back pain, vaginal bleeding, or syncope.    On exam, VSS w no reproducible abdominal tenderness and no CVA tenderness b/l. Based on story - will trial enema and mag citrate. Will bladder scan and send basic labs and urine. Reassess to dispo.     Gilmar Gupta, ED Attending

## 2022-07-28 NOTE — ED ADULT NURSE NOTE - NSIMPLEMENTINTERV_GEN_ALL_ED
Implemented All Universal Safety Interventions:  [place] to call system. Call bell, personal items and telephone within reach. Instruct patient to call for assistance. Room bathroom lighting operational. Non-slip footwear when patient is off stretcher. Physically safe environment: no spills, clutter or unnecessary equipment. Stretcher in lowest position, wheels locked, appropriate side rails in place.

## 2022-07-28 NOTE — ED PROVIDER NOTE - OBJECTIVE STATEMENT
37 y.o. female with PSHx of appendectomy last year, 13 weeks pregnant.    Fetal hr 150 bpm 37 y.o. female with PSHx of appendectomy last year, , currently 13 weeks pregnant, present to the ED with c/o constipation x 4 days.  She states last bowel movement was  AM and was hard and small.  Has not had prior issues with constipation prior to this.  Saw her OB/GYN on Mon. and was advised to go to ED if she still has not had a bowel movement.  She states she has tried metamucil, mirolax, coalace stool softener and coalace lax without any relief.  She states she is also now experiencing difficulty urinating.  Pt states she feels distended and very uncomfortable.  She has the urge to urinate but unable to.  She denies any fever, chills, nausea, vomiting, diarrhea, back pain, vaginal bleeding, or syncope.

## 2022-07-29 ENCOUNTER — ASOB RESULT (OUTPATIENT)
Age: 37
End: 2022-07-29

## 2022-07-29 ENCOUNTER — LABORATORY RESULT (OUTPATIENT)
Age: 37
End: 2022-07-29

## 2022-07-29 ENCOUNTER — APPOINTMENT (OUTPATIENT)
Dept: ANTEPARTUM | Facility: CLINIC | Age: 37
End: 2022-07-29

## 2022-07-29 PROCEDURE — 76801 OB US < 14 WKS SINGLE FETUS: CPT

## 2022-07-29 PROCEDURE — 36415 COLL VENOUS BLD VENIPUNCTURE: CPT

## 2022-07-29 PROCEDURE — 76813 OB US NUCHAL MEAS 1 GEST: CPT

## 2022-07-30 LAB
CULTURE RESULTS: SIGNIFICANT CHANGE UP
SPECIMEN SOURCE: SIGNIFICANT CHANGE UP

## 2022-09-30 ENCOUNTER — ASOB RESULT (OUTPATIENT)
Age: 37
End: 2022-09-30

## 2022-09-30 ENCOUNTER — APPOINTMENT (OUTPATIENT)
Dept: ANTEPARTUM | Facility: CLINIC | Age: 37
End: 2022-09-30
Payer: COMMERCIAL

## 2022-09-30 PROCEDURE — 76811 OB US DETAILED SNGL FETUS: CPT

## 2022-11-21 NOTE — ED ADULT TRIAGE NOTE - CHIEF COMPLAINT QUOTE
Pt is 13 weeks pregnant, c/o constipation. LBM on Sunday. Had a GYN checkup on Monday, WNL. Pt states she's been taking laxatives with no effect. C/o abd distention. No Repair - Repaired With Adjacent Surgical Defect Text (Leave Blank If You Do Not Want): After obtaining clear surgical margins the defect was repaired concurrently with another surgical defect which was in close approximation.

## 2023-01-22 ENCOUNTER — INPATIENT (INPATIENT)
Facility: HOSPITAL | Age: 38
LOS: 3 days | Discharge: HOME | End: 2023-01-26
Attending: STUDENT IN AN ORGANIZED HEALTH CARE EDUCATION/TRAINING PROGRAM | Admitting: STUDENT IN AN ORGANIZED HEALTH CARE EDUCATION/TRAINING PROGRAM
Payer: COMMERCIAL

## 2023-01-22 ENCOUNTER — TRANSCRIPTION ENCOUNTER (OUTPATIENT)
Age: 38
End: 2023-01-22

## 2023-01-22 VITALS
DIASTOLIC BLOOD PRESSURE: 85 MMHG | HEIGHT: 62 IN | HEART RATE: 95 BPM | WEIGHT: 154.98 LBS | SYSTOLIC BLOOD PRESSURE: 139 MMHG | RESPIRATION RATE: 17 BRPM | OXYGEN SATURATION: 95 % | TEMPERATURE: 98 F

## 2023-01-22 DIAGNOSIS — Z90.49 ACQUIRED ABSENCE OF OTHER SPECIFIED PARTS OF DIGESTIVE TRACT: Chronic | ICD-10-CM

## 2023-01-22 DIAGNOSIS — Z98.89 OTHER SPECIFIED POSTPROCEDURAL STATES: Chronic | ICD-10-CM

## 2023-01-22 LAB
BASOPHILS # BLD AUTO: 0.02 K/UL — SIGNIFICANT CHANGE UP (ref 0–0.2)
BASOPHILS NFR BLD AUTO: 0.3 % — SIGNIFICANT CHANGE UP (ref 0–2)
EOSINOPHIL # BLD AUTO: 0.19 K/UL — SIGNIFICANT CHANGE UP (ref 0–0.5)
EOSINOPHIL NFR BLD AUTO: 2.9 % — SIGNIFICANT CHANGE UP (ref 0–6)
HCT VFR BLD CALC: 32.1 % — LOW (ref 34.5–45)
HGB BLD-MCNC: 11.6 G/DL — SIGNIFICANT CHANGE UP (ref 11.5–15.5)
IANC: 3.72 K/UL — SIGNIFICANT CHANGE UP (ref 1.8–7.4)
IMM GRANULOCYTES NFR BLD AUTO: 0.2 % — SIGNIFICANT CHANGE UP (ref 0–0.9)
LYMPHOCYTES # BLD AUTO: 2.05 K/UL — SIGNIFICANT CHANGE UP (ref 1–3.3)
LYMPHOCYTES # BLD AUTO: 30.8 % — SIGNIFICANT CHANGE UP (ref 13–44)
MCHC RBC-ENTMCNC: 30.4 PG — SIGNIFICANT CHANGE UP (ref 27–34)
MCHC RBC-ENTMCNC: 36.1 GM/DL — HIGH (ref 32–36)
MCV RBC AUTO: 84 FL — SIGNIFICANT CHANGE UP (ref 80–100)
MONOCYTES # BLD AUTO: 0.67 K/UL — SIGNIFICANT CHANGE UP (ref 0–0.9)
MONOCYTES NFR BLD AUTO: 10.1 % — SIGNIFICANT CHANGE UP (ref 2–14)
NEUTROPHILS # BLD AUTO: 3.72 K/UL — SIGNIFICANT CHANGE UP (ref 1.8–7.4)
NEUTROPHILS NFR BLD AUTO: 55.7 % — SIGNIFICANT CHANGE UP (ref 43–77)
NRBC # BLD: 0 /100 WBCS — SIGNIFICANT CHANGE UP (ref 0–0)
NRBC # FLD: 0 K/UL — SIGNIFICANT CHANGE UP (ref 0–0)
PLATELET # BLD AUTO: 201 K/UL — SIGNIFICANT CHANGE UP (ref 150–400)
RBC # BLD: 3.82 M/UL — SIGNIFICANT CHANGE UP (ref 3.8–5.2)
RBC # FLD: 11.9 % — SIGNIFICANT CHANGE UP (ref 10.3–14.5)
WBC # BLD: 6.66 K/UL — SIGNIFICANT CHANGE UP (ref 3.8–10.5)
WBC # FLD AUTO: 6.66 K/UL — SIGNIFICANT CHANGE UP (ref 3.8–10.5)

## 2023-01-22 RX ORDER — CHLORHEXIDINE GLUCONATE 213 G/1000ML
1 SOLUTION TOPICAL ONCE
Refills: 0 | Status: DISCONTINUED | OUTPATIENT
Start: 2023-01-22 | End: 2023-01-22

## 2023-01-22 RX ORDER — OXYTOCIN 10 UNIT/ML
333.33 VIAL (ML) INJECTION
Qty: 20 | Refills: 0 | Status: DISCONTINUED | OUTPATIENT
Start: 2023-01-22 | End: 2023-01-26

## 2023-01-22 RX ORDER — SODIUM CHLORIDE 9 MG/ML
1000 INJECTION, SOLUTION INTRAVENOUS
Refills: 0 | Status: DISCONTINUED | OUTPATIENT
Start: 2023-01-22 | End: 2023-01-22

## 2023-01-22 RX ORDER — CITRIC ACID/SODIUM CITRATE 300-500 MG
15 SOLUTION, ORAL ORAL EVERY 6 HOURS
Refills: 0 | Status: DISCONTINUED | OUTPATIENT
Start: 2023-01-22 | End: 2023-01-24

## 2023-01-22 RX ORDER — CHLORHEXIDINE GLUCONATE 213 G/1000ML
1 SOLUTION TOPICAL ONCE
Refills: 0 | Status: DISCONTINUED | OUTPATIENT
Start: 2023-01-22 | End: 2023-01-24

## 2023-01-22 RX ORDER — CITRIC ACID/SODIUM CITRATE 300-500 MG
15 SOLUTION, ORAL ORAL EVERY 6 HOURS
Refills: 0 | Status: DISCONTINUED | OUTPATIENT
Start: 2023-01-22 | End: 2023-01-22

## 2023-01-22 RX ORDER — OXYTOCIN 10 UNIT/ML
333.33 VIAL (ML) INJECTION
Qty: 20 | Refills: 0 | Status: DISCONTINUED | OUTPATIENT
Start: 2023-01-22 | End: 2023-01-22

## 2023-01-22 RX ORDER — SODIUM CHLORIDE 9 MG/ML
1000 INJECTION, SOLUTION INTRAVENOUS
Refills: 0 | Status: DISCONTINUED | OUTPATIENT
Start: 2023-01-22 | End: 2023-01-24

## 2023-01-22 NOTE — OB PROVIDER H&P - ASSESSMENT
Assessment  – 37yoF  gestational age 39w presents for rrIOL. No prenatal issues. GBS -.    Plan  1. Admit to LND. Routine Labs. IVF.  2.IOL w/ PO+CB  3. Fetus: cat 1 tracing. VTX. EFW 3500g by sono. Continuous EFM. Sono. No concerns.  4. Prenatal issues: none, HELLP labs ordered and BP monitoring given hx of elevated BP in office   5. GBS -  6. Pain: IV pain meds/epidural PRN    Patient discussed with attending physician, Dr. Gallego.    Ramana Hernández MD PGY1

## 2023-01-22 NOTE — OB RN PATIENT PROFILE - NS_SUPPORTPERSONNAME_OBGYN_ALL_OB_FT
Problem: Infant Inpatient Plan of Care  Goal: Plan of Care Review  Outcome: Ongoing (interventions implemented as appropriate)  Infant remains in servo controlled isolette on 2.5 L vapotherm with FiO2 between 22-26%. O2 sats labile. She is receiving q 3 hr bolus feedings of DEBM 26 kcal over 1.5 hr and tolerating well. No spits or emesis. Abdominal assessment remains unchanged. Voiding and stooling spontaneously. No episodes of apnea or bradycardia. Mother updated via telephone x 1 per RN. All questions answered and Mother verbalized understanding.  AM CBG obtained as ordered. Vapotherm weaned from 3L to 2.5L per NNP order. Infant rests well in between cares. Will continue to monitor.       Apryl Goodman

## 2023-01-22 NOTE — OB RN PATIENT PROFILE - FALL HARM RISK - UNIVERSAL INTERVENTIONS
Bed in lowest position, wheels locked, appropriate side rails in place/Call bell, personal items and telephone in reach/Instruct patient to call for assistance before getting out of bed or chair/Non-slip footwear when patient is out of bed/[place] to call system/Physically safe environment - no spills, clutter or unnecessary equipment/Purposeful Proactive Rounding/Room/bathroom lighting operational, light cord in reach

## 2023-01-22 NOTE — OB PROVIDER H&P - NSHPPHYSICALEXAM_GEN_ALL_CORE
Objective  – PE:   CV: RRR  Pulm: breathing comfortably on RA  Abd: gravid, nontender  Extr: moving all extremities with ease  – VE: 0/0/-3  – FHT: baseline 130, mod variability, +accels, -decels  – [place]: intermittent   – EFW: 3500g by sono  – Sono: vertex

## 2023-01-22 NOTE — OB RN PATIENT PROFILE - FUNCTIONAL ASSESSMENT - DAILY ACTIVITY ASSESSMENT TYPE
Outcome: Left Message    Please transfer to Patient Outreach Team at [phone] when patient returns call.    WebIZ Checked & Epic Updated:  yes    [facility] Verified: no    Attempt # 1         Admission

## 2023-01-22 NOTE — OB PROVIDER H&P - HISTORY OF PRESENT ILLNESS
Admission H&P    Subjective  HPI: 37yoF  gestational age 39w presents for rrIOL. States that she had elevated BP in office with Dr. Guaman and states Dr. Guaman believed was white coat hypertension. States that BP at home were 110-130s/70-80s. +FM. -LOF. -CTXs. -VB. Pt denies any other concerns.    – PNC: Denies prenatal issues. GBS -.  EFW 3500g by apryl.  – OBHx: P0  – GynHx: denies  – PMH: denies  – PSH: Appendectomy   – Psych: denies   – Social: denies   – Meds: PNV, Fe, Vit D, Asa   – Allergies: Cefotan (hives)  – Will accept blood transfusions? Yes

## 2023-01-23 ENCOUNTER — TRANSCRIPTION ENCOUNTER (OUTPATIENT)
Age: 38
End: 2023-01-23

## 2023-01-23 LAB
ALBUMIN SERPL ELPH-MCNC: 3.1 G/DL — LOW (ref 3.3–5)
ALP SERPL-CCNC: 120 U/L — SIGNIFICANT CHANGE UP (ref 40–120)
ALT FLD-CCNC: 13 U/L — SIGNIFICANT CHANGE UP (ref 4–33)
ANION GAP SERPL CALC-SCNC: 10 MMOL/L — SIGNIFICANT CHANGE UP (ref 7–14)
APPEARANCE UR: CLEAR — SIGNIFICANT CHANGE UP
APTT BLD: 26.7 SEC — LOW (ref 27–36.3)
AST SERPL-CCNC: 17 U/L — SIGNIFICANT CHANGE UP (ref 4–32)
BILIRUB SERPL-MCNC: <0.2 MG/DL — SIGNIFICANT CHANGE UP (ref 0.2–1.2)
BILIRUB UR-MCNC: NEGATIVE — SIGNIFICANT CHANGE UP
BLD GP AB SCN SERPL QL: NEGATIVE — SIGNIFICANT CHANGE UP
BUN SERPL-MCNC: 7 MG/DL — SIGNIFICANT CHANGE UP (ref 7–23)
CALCIUM SERPL-MCNC: 8.9 MG/DL — SIGNIFICANT CHANGE UP (ref 8.4–10.5)
CHLORIDE SERPL-SCNC: 96 MMOL/L — LOW (ref 98–107)
CO2 SERPL-SCNC: 20 MMOL/L — LOW (ref 22–31)
COLOR SPEC: SIGNIFICANT CHANGE UP
COVID-19 SPIKE DOMAIN AB INTERP: POSITIVE
COVID-19 SPIKE DOMAIN ANTIBODY RESULT: >250 U/ML — HIGH
CREAT ?TM UR-MCNC: 78 MG/DL — SIGNIFICANT CHANGE UP
CREAT SERPL-MCNC: 0.5 MG/DL — SIGNIFICANT CHANGE UP (ref 0.5–1.3)
DIFF PNL FLD: NEGATIVE — SIGNIFICANT CHANGE UP
EGFR: 124 ML/MIN/1.73M2 — SIGNIFICANT CHANGE UP
GLUCOSE SERPL-MCNC: 103 MG/DL — HIGH (ref 70–99)
GLUCOSE UR QL: ABNORMAL
INR BLD: <0.9 RATIO — SIGNIFICANT CHANGE UP (ref 0.88–1.16)
KETONES UR-MCNC: NEGATIVE — SIGNIFICANT CHANGE UP
LDH SERPL L TO P-CCNC: 143 U/L — SIGNIFICANT CHANGE UP (ref 135–225)
LEUKOCYTE ESTERASE UR-ACNC: NEGATIVE — SIGNIFICANT CHANGE UP
NITRITE UR-MCNC: NEGATIVE — SIGNIFICANT CHANGE UP
PH UR: 6 — SIGNIFICANT CHANGE UP (ref 5–8)
POTASSIUM SERPL-MCNC: 3.6 MMOL/L — SIGNIFICANT CHANGE UP (ref 3.5–5.3)
POTASSIUM SERPL-SCNC: 3.6 MMOL/L — SIGNIFICANT CHANGE UP (ref 3.5–5.3)
PROT ?TM UR-MCNC: 12 MG/DL — SIGNIFICANT CHANGE UP
PROT ?TM UR-MCNC: 12 MG/DL — SIGNIFICANT CHANGE UP
PROT SERPL-MCNC: 5.9 G/DL — LOW (ref 6–8.3)
PROT UR-MCNC: ABNORMAL
PROT/CREAT UR-RTO: 0.2 RATIO — SIGNIFICANT CHANGE UP (ref 0–0.2)
PROTHROM AB SERPL-ACNC: 10.2 SEC — LOW (ref 10.5–13.4)
RH IG SCN BLD-IMP: POSITIVE — SIGNIFICANT CHANGE UP
RH IG SCN BLD-IMP: POSITIVE — SIGNIFICANT CHANGE UP
SARS-COV-2 IGG+IGM SERPL QL IA: >250 U/ML — HIGH
SARS-COV-2 IGG+IGM SERPL QL IA: POSITIVE
SODIUM SERPL-SCNC: 126 MMOL/L — LOW (ref 135–145)
SP GR SPEC: 1.02 — SIGNIFICANT CHANGE UP (ref 1.01–1.05)
T PALLIDUM AB TITR SER: NEGATIVE — SIGNIFICANT CHANGE UP
URATE SERPL-MCNC: 2.6 MG/DL — SIGNIFICANT CHANGE UP (ref 2.5–7)
UROBILINOGEN FLD QL: SIGNIFICANT CHANGE UP

## 2023-01-23 RX ADMIN — SODIUM CHLORIDE 125 MILLILITER(S): 9 INJECTION, SOLUTION INTRAVENOUS at 00:08

## 2023-01-24 ENCOUNTER — TRANSCRIPTION ENCOUNTER (OUTPATIENT)
Age: 38
End: 2023-01-24

## 2023-01-24 LAB
ALBUMIN SERPL ELPH-MCNC: 3.4 G/DL — SIGNIFICANT CHANGE UP (ref 3.3–5)
ALP SERPL-CCNC: 148 U/L — HIGH (ref 40–120)
ALT FLD-CCNC: 12 U/L — SIGNIFICANT CHANGE UP (ref 4–33)
ANION GAP SERPL CALC-SCNC: 19 MMOL/L — HIGH (ref 7–14)
APTT BLD: 28.2 SEC — SIGNIFICANT CHANGE UP (ref 27–36.3)
AST SERPL-CCNC: 19 U/L — SIGNIFICANT CHANGE UP (ref 4–32)
BASOPHILS # BLD AUTO: 0.03 K/UL — SIGNIFICANT CHANGE UP (ref 0–0.2)
BASOPHILS NFR BLD AUTO: 0.2 % — SIGNIFICANT CHANGE UP (ref 0–2)
BILIRUB SERPL-MCNC: 0.3 MG/DL — SIGNIFICANT CHANGE UP (ref 0.2–1.2)
BLD GP AB SCN SERPL QL: NEGATIVE — SIGNIFICANT CHANGE UP
BUN SERPL-MCNC: 4 MG/DL — LOW (ref 7–23)
CALCIUM SERPL-MCNC: 9.1 MG/DL — SIGNIFICANT CHANGE UP (ref 8.4–10.5)
CHLORIDE SERPL-SCNC: 93 MMOL/L — LOW (ref 98–107)
CO2 SERPL-SCNC: 15 MMOL/L — LOW (ref 22–31)
CREAT SERPL-MCNC: 0.5 MG/DL — SIGNIFICANT CHANGE UP (ref 0.5–1.3)
EGFR: 124 ML/MIN/1.73M2 — SIGNIFICANT CHANGE UP
EOSINOPHIL # BLD AUTO: 0.01 K/UL — SIGNIFICANT CHANGE UP (ref 0–0.5)
EOSINOPHIL NFR BLD AUTO: 0.1 % — SIGNIFICANT CHANGE UP (ref 0–6)
GLUCOSE SERPL-MCNC: 95 MG/DL — SIGNIFICANT CHANGE UP (ref 70–99)
HCT VFR BLD CALC: 35.5 % — SIGNIFICANT CHANGE UP (ref 34.5–45)
HGB BLD-MCNC: 12.5 G/DL — SIGNIFICANT CHANGE UP (ref 11.5–15.5)
IANC: 10.82 K/UL — HIGH (ref 1.8–7.4)
IMM GRANULOCYTES NFR BLD AUTO: 0.5 % — SIGNIFICANT CHANGE UP (ref 0–0.9)
INR BLD: 0.88 RATIO — SIGNIFICANT CHANGE UP (ref 0.88–1.16)
LDH SERPL L TO P-CCNC: 154 U/L — SIGNIFICANT CHANGE UP (ref 135–225)
LYMPHOCYTES # BLD AUTO: 1.01 K/UL — SIGNIFICANT CHANGE UP (ref 1–3.3)
LYMPHOCYTES # BLD AUTO: 8 % — LOW (ref 13–44)
MCHC RBC-ENTMCNC: 29.8 PG — SIGNIFICANT CHANGE UP (ref 27–34)
MCHC RBC-ENTMCNC: 35.2 GM/DL — SIGNIFICANT CHANGE UP (ref 32–36)
MCV RBC AUTO: 84.5 FL — SIGNIFICANT CHANGE UP (ref 80–100)
MONOCYTES # BLD AUTO: 0.71 K/UL — SIGNIFICANT CHANGE UP (ref 0–0.9)
MONOCYTES NFR BLD AUTO: 5.6 % — SIGNIFICANT CHANGE UP (ref 2–14)
NEUTROPHILS # BLD AUTO: 10.82 K/UL — HIGH (ref 1.8–7.4)
NEUTROPHILS NFR BLD AUTO: 85.6 % — HIGH (ref 43–77)
NRBC # BLD: 0 /100 WBCS — SIGNIFICANT CHANGE UP (ref 0–0)
NRBC # FLD: 0 K/UL — SIGNIFICANT CHANGE UP (ref 0–0)
PLATELET # BLD AUTO: 218 K/UL — SIGNIFICANT CHANGE UP (ref 150–400)
POTASSIUM SERPL-MCNC: 3.6 MMOL/L — SIGNIFICANT CHANGE UP (ref 3.5–5.3)
POTASSIUM SERPL-SCNC: 3.6 MMOL/L — SIGNIFICANT CHANGE UP (ref 3.5–5.3)
PROT SERPL-MCNC: 6.8 G/DL — SIGNIFICANT CHANGE UP (ref 6–8.3)
PROTHROM AB SERPL-ACNC: 10.2 SEC — LOW (ref 10.5–13.4)
RBC # BLD: 4.2 M/UL — SIGNIFICANT CHANGE UP (ref 3.8–5.2)
RBC # FLD: 11.9 % — SIGNIFICANT CHANGE UP (ref 10.3–14.5)
RH IG SCN BLD-IMP: POSITIVE — SIGNIFICANT CHANGE UP
SODIUM SERPL-SCNC: 127 MMOL/L — LOW (ref 135–145)
URATE SERPL-MCNC: 3.2 MG/DL — SIGNIFICANT CHANGE UP (ref 2.5–7)
WBC # BLD: 12.64 K/UL — HIGH (ref 3.8–10.5)
WBC # FLD AUTO: 12.64 K/UL — HIGH (ref 3.8–10.5)

## 2023-01-24 DEVICE — ARISTA 3GR: Type: IMPLANTABLE DEVICE | Status: FUNCTIONAL

## 2023-01-24 RX ORDER — ONDANSETRON 8 MG/1
4 TABLET, FILM COATED ORAL EVERY 6 HOURS
Refills: 0 | Status: DISCONTINUED | OUTPATIENT
Start: 2023-01-24 | End: 2023-01-26

## 2023-01-24 RX ORDER — OXYTOCIN 10 UNIT/ML
333.33 VIAL (ML) INJECTION
Qty: 20 | Refills: 0 | Status: DISCONTINUED | OUTPATIENT
Start: 2023-01-24 | End: 2023-01-26

## 2023-01-24 RX ORDER — TETANUS TOXOID, REDUCED DIPHTHERIA TOXOID AND ACELLULAR PERTUSSIS VACCINE, ADSORBED 5; 2.5; 8; 8; 2.5 [IU]/.5ML; [IU]/.5ML; UG/.5ML; UG/.5ML; UG/.5ML
0.5 SUSPENSION INTRAMUSCULAR ONCE
Refills: 0 | Status: DISCONTINUED | OUTPATIENT
Start: 2023-01-24 | End: 2023-01-26

## 2023-01-24 RX ORDER — ACETAMINOPHEN 500 MG
975 TABLET ORAL
Refills: 0 | Status: DISCONTINUED | OUTPATIENT
Start: 2023-01-24 | End: 2023-01-26

## 2023-01-24 RX ORDER — SIMETHICONE 80 MG/1
80 TABLET, CHEWABLE ORAL EVERY 4 HOURS
Refills: 0 | Status: DISCONTINUED | OUTPATIENT
Start: 2023-01-24 | End: 2023-01-26

## 2023-01-24 RX ORDER — ALBUTEROL 90 UG/1
2 AEROSOL, METERED ORAL EVERY 6 HOURS
Refills: 0 | Status: DISCONTINUED | OUTPATIENT
Start: 2023-01-24 | End: 2023-01-26

## 2023-01-24 RX ORDER — DEXAMETHASONE 0.5 MG/5ML
4 ELIXIR ORAL EVERY 6 HOURS
Refills: 0 | Status: DISCONTINUED | OUTPATIENT
Start: 2023-01-24 | End: 2023-01-26

## 2023-01-24 RX ORDER — HEPARIN SODIUM 5000 [USP'U]/ML
5000 INJECTION INTRAVENOUS; SUBCUTANEOUS EVERY 12 HOURS
Refills: 0 | Status: DISCONTINUED | OUTPATIENT
Start: 2023-01-24 | End: 2023-01-26

## 2023-01-24 RX ORDER — DIPHENHYDRAMINE HCL 50 MG
25 CAPSULE ORAL EVERY 6 HOURS
Refills: 0 | Status: DISCONTINUED | OUTPATIENT
Start: 2023-01-24 | End: 2023-01-26

## 2023-01-24 RX ORDER — OXYCODONE HYDROCHLORIDE 5 MG/1
5 TABLET ORAL
Refills: 0 | Status: DISCONTINUED | OUTPATIENT
Start: 2023-01-24 | End: 2023-01-26

## 2023-01-24 RX ORDER — BUTORPHANOL TARTRATE 2 MG/ML
2 INJECTION, SOLUTION INTRAMUSCULAR; INTRAVENOUS ONCE
Refills: 0 | Status: DISCONTINUED | OUTPATIENT
Start: 2023-01-24 | End: 2023-01-24

## 2023-01-24 RX ORDER — LANOLIN
1 OINTMENT (GRAM) TOPICAL EVERY 6 HOURS
Refills: 0 | Status: DISCONTINUED | OUTPATIENT
Start: 2023-01-24 | End: 2023-01-26

## 2023-01-24 RX ORDER — OXYCODONE HYDROCHLORIDE 5 MG/1
5 TABLET ORAL ONCE
Refills: 0 | Status: DISCONTINUED | OUTPATIENT
Start: 2023-01-24 | End: 2023-01-26

## 2023-01-24 RX ORDER — KETOROLAC TROMETHAMINE 30 MG/ML
30 SYRINGE (ML) INJECTION EVERY 6 HOURS
Refills: 0 | Status: DISCONTINUED | OUTPATIENT
Start: 2023-01-24 | End: 2023-01-25

## 2023-01-24 RX ORDER — NALOXONE HYDROCHLORIDE 4 MG/.1ML
0.1 SPRAY NASAL
Refills: 0 | Status: DISCONTINUED | OUTPATIENT
Start: 2023-01-24 | End: 2023-01-26

## 2023-01-24 RX ORDER — OXYTOCIN 10 UNIT/ML
VIAL (ML) INJECTION
Qty: 30 | Refills: 0 | Status: DISCONTINUED | OUTPATIENT
Start: 2023-01-24 | End: 2023-01-24

## 2023-01-24 RX ORDER — MAGNESIUM HYDROXIDE 400 MG/1
30 TABLET, CHEWABLE ORAL
Refills: 0 | Status: DISCONTINUED | OUTPATIENT
Start: 2023-01-24 | End: 2023-01-26

## 2023-01-24 RX ORDER — ALBUTEROL 90 UG/1
2 AEROSOL, METERED ORAL EVERY 6 HOURS
Refills: 0 | Status: DISCONTINUED | OUTPATIENT
Start: 2023-01-24 | End: 2023-01-24

## 2023-01-24 RX ORDER — SODIUM CHLORIDE 9 MG/ML
1000 INJECTION, SOLUTION INTRAVENOUS
Refills: 0 | Status: DISCONTINUED | OUTPATIENT
Start: 2023-01-24 | End: 2023-01-26

## 2023-01-24 RX ORDER — IBUPROFEN 200 MG
600 TABLET ORAL EVERY 6 HOURS
Refills: 0 | Status: COMPLETED | OUTPATIENT
Start: 2023-01-24 | End: 2023-12-23

## 2023-01-24 RX ORDER — PANTOPRAZOLE SODIUM 20 MG/1
40 TABLET, DELAYED RELEASE ORAL DAILY
Refills: 0 | Status: DISCONTINUED | OUTPATIENT
Start: 2023-01-24 | End: 2023-01-24

## 2023-01-24 RX ADMIN — BUTORPHANOL TARTRATE 2 MILLIGRAM(S): 2 INJECTION, SOLUTION INTRAMUSCULAR; INTRAVENOUS at 04:37

## 2023-01-24 RX ADMIN — BUTORPHANOL TARTRATE 2 MILLIGRAM(S): 2 INJECTION, SOLUTION INTRAMUSCULAR; INTRAVENOUS at 04:07

## 2023-01-24 RX ADMIN — Medication 1000 MILLIUNIT(S)/MIN: at 21:49

## 2023-01-24 RX ADMIN — SODIUM CHLORIDE 125 MILLILITER(S): 9 INJECTION, SOLUTION INTRAVENOUS at 10:14

## 2023-01-24 RX ADMIN — SODIUM CHLORIDE 125 MILLILITER(S): 9 INJECTION, SOLUTION INTRAVENOUS at 21:49

## 2023-01-24 RX ADMIN — Medication 15 MILLILITER(S): at 13:52

## 2023-01-24 NOTE — OB PROVIDER LABOR PROGRESS NOTE - NS_OBIHIFHRDETAILS_OBGYN_ALL_OB_FT
125/mod/+accel/+decel; resolved s/p resuscitation
125/mod/no accel/no decel
120 bpm, minimal variability, no accels, late decelerations, prolonged deceleration x2 min
125/mod/+accel/-decel

## 2023-01-24 NOTE — DISCHARGE NOTE OB - MATERIALS PROVIDED
PP materials provided PP materials provided/Vaccinations/[place] [employer] Screening Program/  Immunization Record/Breastfeeding Log/Breastfeeding Mother’s Support Group Information/Guide to Postpartum Care/[facility] Hearing Screen Program/Back To Sleep Handout/Shaken Baby Prevention Handout/Breastfeeding Guide and Packet/Birth Certificate Instructions/Discharge Medication Information for Patients and Families Pocket Guide/Tdap Vaccination (VIS Pub Date: 2012)

## 2023-01-24 NOTE — DISCHARGE NOTE OB - CARE PROVIDER_API CALL
Gayle Aranda)  Obstetrics and Gynecology  [address] [place] [address]  Phone: [phone]  Fax: [phone]  Established Patient  Follow Up Time: 2 weeks   Gayle Aranda)  Obstetrics and Gynecology  [address] [place] [address]  Phone: [phone]  Fax: [phone]  Established Patient  Follow Up Time: 1 week

## 2023-01-24 NOTE — DISCHARGE NOTE OB - ADDITIONAL INSTRUCTIONS
Follow up in P office in 1-2 weeks for incision check Follow up in [facility] office in 1wk for BP check

## 2023-01-24 NOTE — OB PROVIDER DELIVERY SUMMARY - NSNUMBEROFNEWBORNS_OBGYN_ALL_OB_NU
1 Xenograft Text: The defect edges were debeveled with a #15 scalpel blade.  Given the location of the defect, shape of the defect and the proximity to free margins a xenograft was deemed most appropriate.  The graft was then trimmed to fit the size of the defect.  The graft was then placed in the primary defect and oriented appropriately.

## 2023-01-24 NOTE — OB RN DELIVERY SUMMARY - NSSELHIDDEN_OBGYN_ALL_OB_FT
[NS_DeliveryAttending1_OBGYN_ALL_OB_FT:[id number]=],[NS_DeliveryAssist1_OBGYN_ALL_OB_FT:[id number]=]

## 2023-01-24 NOTE — OB PROVIDER DELIVERY SUMMARY - NSPROVIDERDELIVERYNOTE_OBGYN_ALL_OB_FT
viable male infant, cephalic presentation, weight 6#15, APGARS 7/9  grossly normal uterus, b/l tubes and ovaries  hysterotomy closed in 1 layer  sabino placed over hysterotomy     900/1800/75    Dictation #: viable male infant, cephalic presentation, weight 6#15, APGARS 7/9  grossly normal uterus, b/l tubes and ovaries  hysterotomy closed in 1 layer  sabino placed over hysterotomy     900/1800/75    Dictation #: [id number]

## 2023-01-24 NOTE — OB PROVIDER LABOR PROGRESS NOTE - NS_SUBJECTIVE/OBJECTIVE_OBGYN_ALL_OB_FT
Patient seen at the bedside for tracing. Once at bedside, patient sitting up in bed eating jello cup.
Patient seen for VE and second attempt to place CB    SVE: 0/0/-3. CB not placed.
Patient examined due to category II tracing. Epidural in place and effective.     Vital Signs Last 24 Hrs  T(C): 36.4 (24 Jan 2023 10:00), Max: 37 (23 Jan 2023 21:35)  T(F): 97.5 (24 Jan 2023 10:00), Max: 98.6 (23 Jan 2023 21:35)  HR: 83 (24 Jan 2023 12:13) (63 - 112)  BP: 134/84 (24 Jan 2023 12:16) (108/59 - 173/88)  RR: 20 (24 Jan 2023 10:00) (17 - 20)  SpO2: 100% (24 Jan 2023 12:13) (92% - 100%)    Parameters below as of 24 Jan 2023 10:00  Patient On (Oxygen Delivery Method): room air
Patient seen for CE in the setting of SROM at 12am
Patient seen and examined at bedside for CB placement. Indication and procedure explained to pt with all questions answered to apparent satisfaction. Pt confirms understanding, agrees to proceed.    SVE: 0/0/-3; unable to place CB
Patient seen at bedside for 1-2 minute decel. Patient feeling increased rectal pressure. Recently sat down from epidural. Tracing recovered with IVF bolus and maternal repositioning.

## 2023-01-24 NOTE — OB RN DELIVERY SUMMARY - NS_SEPSISRSKCALC_OBGYN_ALL_OB_FT
EOS calculated successfully. EOS Risk Factor: 0.5/1000 live births ([facility] national incidence); GA=39w2d; Temp=98.6; ROM=19.517; GBS='Negative'; Antibiotics='No antibiotics or any antibiotics < 2 hrs prior to birth'

## 2023-01-24 NOTE — OB PROVIDER DELIVERY SUMMARY - NSSELHIDDEN_OBGYN_ALL_OB_FT
[NS_DeliveryAttending1_OBGYN_ALL_OB_FT:[id number]=],[NS_DeliveryAssist1_OBGYN_ALL_OB_FT:[id number]=]

## 2023-01-24 NOTE — DISCHARGE NOTE OB - PATIENT PORTAL LINK FT
You can access the FollowMyHealth Patient Portal offered by [facility] by registering at the following website: [email or web address]. By joining [employer]’s FollowMyHealth portal, you will also be able to view your health information using other applications (apps) compatible with our system.

## 2023-01-24 NOTE — OB PROVIDER LABOR PROGRESS NOTE - ASSESSMENT
-cw PO cytotec  -Pt SROM  -CE as indicated  -CB when possible  -FHT monitoring    Ramana Hernández PGY 1
37y P0 at 39w1d eIOL, patient stable with reactive tracing, not making cervical change  - Attempted CB x2, however cervix closd  - Cont PO  - [place]/EFM/IVF    Sinks PGY1
eIOL P0 at 39w1d, patient stable  - CB attempted, but cervix closed. Will re-attempt  - Cont PO    Sinks PGY1
CNM OB Progress Note    Patient seen and evaluated at bedside.  Denies complaints.  Comfortable w/ anesthesia epidural.  Patient pulse noted on EFM as patient was sitting up in bed along with fetal heart rate.      T(C): 36.8 (01-24-23 @ 14:00), Max: 37 (01-23-23 @ 21:35)  HR: 90 (01-24-23 @ 15:01) (63 - 115)  BP: 136/91 (01-24-23 @ 14:47) (108/59 - 173/88)  RR: 18 (01-24-23 @ 12:00) (17 - 20)  SpO2: 100% (01-24-23 @ 14:58) (86% - 100%)    SVE: 8/80/-2, bloody fluids noted on lai, caput noted    EFM: 120bpm, moderate variability, +accels, +deceleration noted   [place]:  irregular ctx    A/P 37y P0 @ 39.1wks admitted for rr IOL. IUI, white coat HTN, HELLP labs WNL, PC 0.2 Asthma.     -Fetus: EFW 7#6  -GBS negative  -Analgesia: anesthesia epidural  -Induction with: s/p PO Cytotec    -advanced VE, 8/80/-2, caput  -repositioned to left lateral position, tracing improved  -peanutball use recommended  -LR fluids at 250ml/hr  -informed to continue on ice chips only, no further jello consumption  -reposition PRN    -Continue to monitor with EFM & TOCO  -Recheck patient in 2-4 hours or PRN    Discussed with MD Bryan, (informed MD Dahl as well (Back up attending)    KIRSTEN Dickerson  
37 y.o  eIOL, making cervical change    -VE 5.5/90/-1  -repositioned to lateral sides  -Dr. Bryan and CHRIS Justice CNM at bedside  -return of FHR to baseline  -Pitocin initiation held at this time    Sariah Fairchild NP  
P0 eIOL c/b labile BPs with HELLP labs and P/C wnl, with intermittent cat II tracing responding to resuscitation  - No need for CB  - Will d/c PO and start pitocin once 30 min of category I tracing  - Cont IVF/[place]/EFM    D/w Dr. Irvin Bhakta PGY1

## 2023-01-24 NOTE — OB PROVIDER LABOR PROGRESS NOTE - NSVAGINALEXAM_OBGYN_ALL_OB_DT
24-Jan-2023 11:06
24-Jan-2023 12:07
23-Jan-2023 11:48
23-Jan-2023 16:53
24-Jan-2023 01:19
24-Jan-2023 14:40

## 2023-01-24 NOTE — DISCHARGE NOTE OB - MEDICATION SUMMARY - MEDICATIONS TO TAKE
I will START or STAY ON the medications listed below when I get home from the hospital:  None I will START or STAY ON the medications listed below when I get home from the hospital:    acetaminophen 325 mg oral tablet  -- 3 tab(s) by mouth every 6 hours, As Needed  -- Indication: For Pain     ibuprofen 600 mg oral tablet  -- 1 tab(s) by mouth every 6 hours, As Needed  -- Indication: For Pain

## 2023-01-24 NOTE — DISCHARGE NOTE OB - PROVIDER TOKENS
PROVIDER:[TOKEN:[[id number]],FOLLOWUP:[2 weeks],ESTABLISHEDPATIENT:[T]] PROVIDER:[TOKEN:[[id number]],FOLLOWUP:[1 week],ESTABLISHEDPATIENT:[T]]

## 2023-01-24 NOTE — OB RN INTRAOPERATIVE NOTE - NSSELHIDDEN_OBGYN_ALL_OB_FT
[NS_DeliveryAttending1_OBGYN_ALL_OB_FT:[id number]],[NS_DeliveryAssist1_OBGYN_ALL_OB_FT:[id number]=]

## 2023-01-24 NOTE — OB NEONATOLOGY/PEDIATRICIAN DELIVERY SUMMARY - NSPEDSNEONOTESA_OBGYN_ALL_OB_FT
Pediatrician called to delivery for Cat II FHT. Male infant born at 39 2/7 wks via  to a 38 y/o  blood type A+ mother. IUI pregnancy. Maternal history of ADHD (d/c Adderall ), Anemia. Prenatal labs nr/immune/-, GBS - on 22. Covid neg. SROM at 00:30 on  with clear fluids. EOS score 0.19, highest maternal temperature 37. Baby emerged nuchal x1, stunned and cyanotic. Cord clamping delayed 30sec. Infant was brought to radiant warmer and warmed, dried, stimulated and suctioned. HR>100, normal respiratory effort. APGARS of 7/9. Mom is initiating breast feeding. Consents to Hepatitis B vaccination. Desires for infant to be circumcised.    BW: 3170g  : 23  TOB: 20:01

## 2023-01-24 NOTE — CHART NOTE - NSCHARTNOTEFT_GEN_A_CORE
Patient seen at bedside   Tracing category 1   VE: 10/100/-1   Discussed with patient failure to descend   Recommendation for  section discussed   Risks, benefits and alternatives discussed   Risks including but not limited to infection, hemorrhage and injury to surrounding organs   All questions answered and consent signed

## 2023-01-24 NOTE — DISCHARGE NOTE OB - NS MD DC FALL RISK RISK
For information on Fall & Injury Prevention, visit: [email or web address] OR  [email or web address] OR  https://www.cdc.gov/steadi/patient.html

## 2023-01-25 LAB
BASOPHILS # BLD AUTO: 0.03 K/UL — SIGNIFICANT CHANGE UP (ref 0–0.2)
BASOPHILS NFR BLD AUTO: 0.1 % — SIGNIFICANT CHANGE UP (ref 0–2)
EOSINOPHIL # BLD AUTO: 0.01 K/UL — SIGNIFICANT CHANGE UP (ref 0–0.5)
EOSINOPHIL NFR BLD AUTO: 0 % — SIGNIFICANT CHANGE UP (ref 0–6)
HCT VFR BLD CALC: 31.8 % — LOW (ref 34.5–45)
HGB BLD-MCNC: 11 G/DL — LOW (ref 11.5–15.5)
IANC: 18.84 K/UL — HIGH (ref 1.8–7.4)
IMM GRANULOCYTES NFR BLD AUTO: 0.7 % — SIGNIFICANT CHANGE UP (ref 0–0.9)
LYMPHOCYTES # BLD AUTO: 1.08 K/UL — SIGNIFICANT CHANGE UP (ref 1–3.3)
LYMPHOCYTES # BLD AUTO: 5 % — LOW (ref 13–44)
MCHC RBC-ENTMCNC: 30.4 PG — SIGNIFICANT CHANGE UP (ref 27–34)
MCHC RBC-ENTMCNC: 34.6 GM/DL — SIGNIFICANT CHANGE UP (ref 32–36)
MCV RBC AUTO: 87.8 FL — SIGNIFICANT CHANGE UP (ref 80–100)
MONOCYTES # BLD AUTO: 1.54 K/UL — HIGH (ref 0–0.9)
MONOCYTES NFR BLD AUTO: 7.1 % — SIGNIFICANT CHANGE UP (ref 2–14)
NEUTROPHILS # BLD AUTO: 18.84 K/UL — HIGH (ref 1.8–7.4)
NEUTROPHILS NFR BLD AUTO: 87.1 % — HIGH (ref 43–77)
NRBC # BLD: 0 /100 WBCS — SIGNIFICANT CHANGE UP (ref 0–0)
NRBC # FLD: 0 K/UL — SIGNIFICANT CHANGE UP (ref 0–0)
PLATELET # BLD AUTO: 194 K/UL — SIGNIFICANT CHANGE UP (ref 150–400)
RBC # BLD: 3.62 M/UL — LOW (ref 3.8–5.2)
RBC # FLD: 12.4 % — SIGNIFICANT CHANGE UP (ref 10.3–14.5)
WBC # BLD: 21.66 K/UL — HIGH (ref 3.8–10.5)
WBC # FLD AUTO: 21.66 K/UL — HIGH (ref 3.8–10.5)

## 2023-01-25 RX ORDER — IBUPROFEN 200 MG
600 TABLET ORAL EVERY 6 HOURS
Refills: 0 | Status: DISCONTINUED | OUTPATIENT
Start: 2023-01-25 | End: 2023-01-26

## 2023-01-25 RX ORDER — ACETAMINOPHEN 500 MG
3 TABLET ORAL
Qty: 0 | Refills: 0 | DISCHARGE
Start: 2023-01-25

## 2023-01-25 RX ORDER — IBUPROFEN 200 MG
1 TABLET ORAL
Qty: 0 | Refills: 0 | DISCHARGE
Start: 2023-01-25

## 2023-01-25 RX ADMIN — HEPARIN SODIUM 5000 UNIT(S): 5000 INJECTION INTRAVENOUS; SUBCUTANEOUS at 05:52

## 2023-01-25 RX ADMIN — Medication 30 MILLIGRAM(S): at 11:55

## 2023-01-25 RX ADMIN — Medication 30 MILLIGRAM(S): at 06:22

## 2023-01-25 RX ADMIN — Medication 975 MILLIGRAM(S): at 21:19

## 2023-01-25 RX ADMIN — Medication 975 MILLIGRAM(S): at 21:49

## 2023-01-25 RX ADMIN — Medication 30 MILLIGRAM(S): at 18:00

## 2023-01-25 RX ADMIN — Medication 30 MILLIGRAM(S): at 12:25

## 2023-01-25 RX ADMIN — HEPARIN SODIUM 5000 UNIT(S): 5000 INJECTION INTRAVENOUS; SUBCUTANEOUS at 17:57

## 2023-01-25 RX ADMIN — Medication 30 MILLIGRAM(S): at 05:52

## 2023-01-25 NOTE — LACTATION INITIAL EVALUATION - LACTATION INTERVENTIONS
assisted to put baby to both breasts in football hold position;  importance of deep latch discussed and  pt. encouraged to apply colostrum to nipple and allow to air dry./initiate/review safe skin-to-skin/initiate/review techniques for position and latch/review techniques to manage sore nipples/engorgement/initiate/review breast massage/compression/reviewed components of an effective feeding and at least 8 effective feedings per day required/reviewed importance of monitoring infant diapers, the breastfeeding log, and minimum output each day/reviewed risks of unnecessary formula supplementation/reviewed risks of artificial nipples/reviewed benefits and recommendations for rooming in/reviewed feeding on demand/by cue at least 8 times a day/recommended follow-up with pediatrician within 24 hours of discharge

## 2023-01-25 NOTE — PROGRESS NOTE ADULT - ASSESSMENT
A/P: 36yo POD#1 s/p pLTCS 2/2 second stage arrest EBL 900cc .  Patient is stable and doing well post-operatively.    - Continue regular diet.  - Increase ambulation.  - Continue motrin, tylenol, oxycodone PRN for pain control.    - F/u AM CBC    Ramana Hernández PGY1` A/P: 36yo POD#1 s/p pLTCS 2/2 second stage arrest EBL 900cc . Pt is a [place].  Patient is stable and doing well post-operatively.    -cw BP monitoring   - Continue regular diet.  - Increase ambulation.  - Continue motrin, tylenol, oxycodone PRN for pain control.    - F/u AM CBC    Ramana Hernández PGY1`

## 2023-01-25 NOTE — PROVIDER CONTACT NOTE (OTHER) - ASSESSMENT
Pt appears comfortable, pt is not distended, pt feels as if she is fully emptying her bladder, pt denies pain

## 2023-01-26 VITALS
OXYGEN SATURATION: 100 % | TEMPERATURE: 98 F | RESPIRATION RATE: 18 BRPM | DIASTOLIC BLOOD PRESSURE: 80 MMHG | SYSTOLIC BLOOD PRESSURE: 130 MMHG | HEART RATE: 88 BPM

## 2023-01-26 LAB
ALBUMIN SERPL ELPH-MCNC: 3 G/DL — LOW (ref 3.3–5)
ALP SERPL-CCNC: 96 U/L — SIGNIFICANT CHANGE UP (ref 40–120)
ALT FLD-CCNC: 17 U/L — SIGNIFICANT CHANGE UP (ref 4–33)
ANION GAP SERPL CALC-SCNC: 8 MMOL/L — SIGNIFICANT CHANGE UP (ref 7–14)
APTT BLD: 27.9 SEC — SIGNIFICANT CHANGE UP (ref 27–36.3)
AST SERPL-CCNC: 39 U/L — HIGH (ref 4–32)
BASOPHILS # BLD AUTO: 0.05 K/UL — SIGNIFICANT CHANGE UP (ref 0–0.2)
BASOPHILS NFR BLD AUTO: 0.3 % — SIGNIFICANT CHANGE UP (ref 0–2)
BILIRUB SERPL-MCNC: <0.2 MG/DL — SIGNIFICANT CHANGE UP (ref 0.2–1.2)
BUN SERPL-MCNC: 9 MG/DL — SIGNIFICANT CHANGE UP (ref 7–23)
CALCIUM SERPL-MCNC: 9 MG/DL — SIGNIFICANT CHANGE UP (ref 8.4–10.5)
CHLORIDE SERPL-SCNC: 105 MMOL/L — SIGNIFICANT CHANGE UP (ref 98–107)
CO2 SERPL-SCNC: 24 MMOL/L — SIGNIFICANT CHANGE UP (ref 22–31)
CREAT SERPL-MCNC: 0.68 MG/DL — SIGNIFICANT CHANGE UP (ref 0.5–1.3)
EGFR: 115 ML/MIN/1.73M2 — SIGNIFICANT CHANGE UP
EOSINOPHIL # BLD AUTO: 0.21 K/UL — SIGNIFICANT CHANGE UP (ref 0–0.5)
EOSINOPHIL NFR BLD AUTO: 1.4 % — SIGNIFICANT CHANGE UP (ref 0–6)
FIBRINOGEN PPP-MCNC: 622 MG/DL — HIGH (ref 200–465)
GLUCOSE SERPL-MCNC: 62 MG/DL — LOW (ref 70–99)
HCT VFR BLD CALC: 29.2 % — LOW (ref 34.5–45)
HGB BLD-MCNC: 10.1 G/DL — LOW (ref 11.5–15.5)
IANC: 10.58 K/UL — HIGH (ref 1.8–7.4)
IMM GRANULOCYTES NFR BLD AUTO: 0.7 % — SIGNIFICANT CHANGE UP (ref 0–0.9)
INR BLD: 0.89 RATIO — SIGNIFICANT CHANGE UP (ref 0.88–1.16)
LDH SERPL L TO P-CCNC: 249 U/L — HIGH (ref 135–225)
LYMPHOCYTES # BLD AUTO: 21.3 % — SIGNIFICANT CHANGE UP (ref 13–44)
LYMPHOCYTES # BLD AUTO: 3.24 K/UL — SIGNIFICANT CHANGE UP (ref 1–3.3)
MCHC RBC-ENTMCNC: 30.4 PG — SIGNIFICANT CHANGE UP (ref 27–34)
MCHC RBC-ENTMCNC: 34.6 GM/DL — SIGNIFICANT CHANGE UP (ref 32–36)
MCV RBC AUTO: 88 FL — SIGNIFICANT CHANGE UP (ref 80–100)
MONOCYTES # BLD AUTO: 1.05 K/UL — HIGH (ref 0–0.9)
MONOCYTES NFR BLD AUTO: 6.9 % — SIGNIFICANT CHANGE UP (ref 2–14)
NEUTROPHILS # BLD AUTO: 10.58 K/UL — HIGH (ref 1.8–7.4)
NEUTROPHILS NFR BLD AUTO: 69.4 % — SIGNIFICANT CHANGE UP (ref 43–77)
NRBC # BLD: 0 /100 WBCS — SIGNIFICANT CHANGE UP (ref 0–0)
NRBC # FLD: 0 K/UL — SIGNIFICANT CHANGE UP (ref 0–0)
PLATELET # BLD AUTO: 226 K/UL — SIGNIFICANT CHANGE UP (ref 150–400)
POTASSIUM SERPL-MCNC: 4.4 MMOL/L — SIGNIFICANT CHANGE UP (ref 3.5–5.3)
POTASSIUM SERPL-SCNC: 4.4 MMOL/L — SIGNIFICANT CHANGE UP (ref 3.5–5.3)
PROT SERPL-MCNC: 5.8 G/DL — LOW (ref 6–8.3)
PROTHROM AB SERPL-ACNC: 10.3 SEC — LOW (ref 10.5–13.4)
RBC # BLD: 3.32 M/UL — LOW (ref 3.8–5.2)
RBC # FLD: 12.7 % — SIGNIFICANT CHANGE UP (ref 10.3–14.5)
SODIUM SERPL-SCNC: 137 MMOL/L — SIGNIFICANT CHANGE UP (ref 135–145)
URATE SERPL-MCNC: 4.6 MG/DL — SIGNIFICANT CHANGE UP (ref 2.5–7)
WBC # BLD: 15.23 K/UL — HIGH (ref 3.8–10.5)
WBC # FLD AUTO: 15.23 K/UL — HIGH (ref 3.8–10.5)

## 2023-01-26 PROCEDURE — 76705 ECHO EXAM OF ABDOMEN: CPT | Mod: 26

## 2023-01-26 RX ADMIN — Medication 600 MILLIGRAM(S): at 18:43

## 2023-01-26 RX ADMIN — HEPARIN SODIUM 5000 UNIT(S): 5000 INJECTION INTRAVENOUS; SUBCUTANEOUS at 06:29

## 2023-01-26 RX ADMIN — Medication 600 MILLIGRAM(S): at 11:52

## 2023-01-26 RX ADMIN — Medication 600 MILLIGRAM(S): at 00:38

## 2023-01-26 RX ADMIN — Medication 975 MILLIGRAM(S): at 09:35

## 2023-01-26 RX ADMIN — Medication 600 MILLIGRAM(S): at 06:58

## 2023-01-26 RX ADMIN — Medication 600 MILLIGRAM(S): at 00:08

## 2023-01-26 RX ADMIN — SIMETHICONE 80 MILLIGRAM(S): 80 TABLET, CHEWABLE ORAL at 09:05

## 2023-01-26 RX ADMIN — Medication 600 MILLIGRAM(S): at 12:22

## 2023-01-26 RX ADMIN — Medication 600 MILLIGRAM(S): at 18:12

## 2023-01-26 RX ADMIN — Medication 975 MILLIGRAM(S): at 03:55

## 2023-01-26 RX ADMIN — Medication 975 MILLIGRAM(S): at 15:17

## 2023-01-26 RX ADMIN — HEPARIN SODIUM 5000 UNIT(S): 5000 INJECTION INTRAVENOUS; SUBCUTANEOUS at 18:12

## 2023-01-26 RX ADMIN — Medication 600 MILLIGRAM(S): at 06:28

## 2023-01-26 RX ADMIN — Medication 975 MILLIGRAM(S): at 09:05

## 2023-01-26 RX ADMIN — Medication 975 MILLIGRAM(S): at 04:25

## 2023-01-26 RX ADMIN — Medication 975 MILLIGRAM(S): at 15:48

## 2023-01-26 NOTE — PROGRESS NOTE ADULT - SUBJECTIVE AND OBJECTIVE BOX
· Subjective and Objective:   OB Progress Note:  Delivery, POD#2    S: 38yo POD#2 s/p LTCS . Her pain is well controlled. She is tolerating a regular diet and + passing flatus. Denies N/V. Denies CP/SOB/lightheadedness/dizziness.   She is ambulating without difficulty.     O:   Vital Signs Last 24 Hrs  T(C): 36.6 (2023 05:20), Max: 36.6 (2023 22:01)  T(F): 97.8 (2023 05:20), Max: 97.9 (2023 22:01)  HR: 85 (2023 05:20) (85 - 102)  BP: 126/81 (2023 05:20) (103/64 - 128/67)  BP(mean): --  RR: 18 (2023 05:20) (16 - 18)  SpO2: 100% (2023 05:20) (97% - 100%)    Parameters below as of 2023 05:20  Patient On (Oxygen Delivery Method): room air      Labs:  Blood type: A Positive  Rubella IgG: RPR: Negative                  LABS:  cret                        10.1   15.23 )-----------( 226      ( 2023 04:45 )             29.2     PE:  General: NAD  Abdomen: Mildly distended, appropriately tender, soft, incision c/d/i. However increasing tenderness at RUQ w palpation  Extremities: No erythema, no pitting edema        Assessment and Plan:   · Assessment	  A/P: 38yo POD#2 s/p pLTCS 2/2 second stage arrest EBL 900cc . Pt is a gHTN.  Patient is stable and doing well post-operatively.    - cw BP monitoring   - Continue regular diet.  - Increase ambulation.  - Continue motrin, tylenol, oxycodone PRN for pain control.    - Reviewed CBC this Morning  - Encouraged Mylicon x1, ambulation, inc PO hydration, will reevaluate RUQ pain s/p mylicon. DR Cool notified   - BP cuff rx sent, Check BP TID, call office if BP >140/90  - Strict PEC prec reviewed w patient atlength  - RTO in 5 days for BP Check  - Discharge planning  - DW DR Travon Jeffers [facility]  [phone]  
OB Progress Note:  Delivery, POD#1    S: 38yo POD#1 s/p LTCS . Her pain is well controlled. She is tolerating a regular diet and has not passed flatus. Denies N/V. Denies CP/SOB/lightheadedness/dizziness.   She is ambulating without difficulty. [place] still in place.     O:   Vital Signs Last 24 Hrs  T(C): 36.6 (2023 05:58), Max: 37 (2023 16:00)  T(F): 97.8 (2023 05:58), Max: 98.6 (2023 16:00)  HR: 96 (2023 05:58) (77 - 156)  BP: 119/76 (2023 05:58) (108/59 - 173/88)  BP(mean): 79 (2023 22:45) (73 - 106)  RR: 18 (2023 05:58) (16 - 24)  SpO2: 98% (2023 05:58) (86% - 100%)    Parameters below as of 2023 05:58  Patient On (Oxygen Delivery Method): room air        Labs:  Blood type: A Positive  Rubella IgG: RPR: Negative                          11.0<L>   -- >-----------< 194    (  @ 05:25 )             31.8<L>                        12.5   12.64<H> >-----------< 218    (  @ 08:54 )             35.5                        11.6   6.66 >-----------< 201    (  @ 23:30 )             32.1<L>    23 @ 08:54      127<L>  |  93<L>  |  4<L>  ----------------------------<  95  3.6   |  15<L>  |  0.50    23 23:30      126<L>  |  96<L>  |  7   ----------------------------<  103<H>  3.6   |  20<L>  |  0.50        Ca    9.1      2023 08:54  Ca    8.9      2023 23:30    TPro  6.8  /  Alb  3.4  /  TBili  0.3  /  DBili  x   /  AST  19  /  ALT  12  /  AlkPhos  148<H>  23 @ 08:54  TPro  5.9<L>  /  Alb  3.1<L>  /  TBili  <0.2  /  DBili  x   /  AST  17  /  ALT  13  /  AlkPhos  120  23 @ 23:30          PE:  General: NAD  Abdomen: Mildly distended, appropriately tender, incision c/d/i.  Extremities: No erythema, no pitting edema    
Postop Day  1  s/p   C- Section    THERAPY:  [x  ] Spinal /epidural  morphine  was given in OR    Subjective: no major complaints    Pain scale score: at rest _3_, with activity _6_    Sedation Score:	  [x  ] Alert	    [  ] Drowsy        [  ] Arousable	[  ] Asleep	[  ] Unresponsive    Side Effects:	  [ x ] None	     [  ] Nausea        [  ] Pruritus        [  ] Weakness   [  ] Numbness      Objective: ambulates,  back non-tender, gross neuro exam normal    T(C): 36.6 (01-25-23 @ 05:58), Max: 37 (01-24-23 @ 16:00)  HR: 96 (01-25-23 @ 05:58) (77 - 156)  BP: 119/76 (01-25-23 @ 05:58) (108/59 - 173/88)  RR: 18 (01-25-23 @ 05:58) (16 - 24)  SpO2: 98% (01-25-23 @ 05:58) (86% - 100%)  Wt(kg): --    ASSESSMENT/ PLAN   [ x ] Continue PRN analgesics  [ x ]Documentation and Verification of current medications     acetaminophen     Tablet .. 975 milliGRAM(s) Oral <User Schedule>  albuterol    90 MICROgram(s) HFA Inhaler 2 Puff(s) Inhalation every 6 hours PRN  dexAMETHasone  Injectable 4 milliGRAM(s) IV Push every 6 hours PRN  diphenhydrAMINE 25 milliGRAM(s) Oral every 6 hours PRN  diphtheria/tetanus/pertussis (acellular) Vaccine (Adacel) 0.5 milliLiter(s) IntraMuscular once  heparin   Injectable 5000 Unit(s) SubCutaneous every 12 hours  ibuprofen  Tablet. 600 milliGRAM(s) Oral every 6 hours  ketorolac   Injectable 30 milliGRAM(s) IV Push every 6 hours  lactated ringers. 1000 milliLiter(s) IV Continuous <Continuous>  lanolin Ointment 1 Application(s) Topical every 6 hours PRN  magnesium hydroxide Suspension 30 milliLiter(s) Oral two times a day PRN  naloxone Injectable 0.1 milliGRAM(s) IV Push every 3 minutes PRN  ondansetron Injectable 4 milliGRAM(s) IV Push every 6 hours PRN  oxyCODONE    IR 5 milliGRAM(s) Oral every 3 hours PRN  oxyCODONE    IR 5 milliGRAM(s) Oral once PRN  oxytocin Infusion 333.333 milliUNIT(s)/Min IV Continuous <Continuous>  oxytocin Infusion 333.333 milliUNIT(s)/Min IV Continuous <Continuous>  simethicone 80 milliGRAM(s) Chew every 4 hours PRN      Comments: No anesthesia related complications noticed

## 2023-03-01 ENCOUNTER — APPOINTMENT (OUTPATIENT)
Age: 38
End: 2023-03-01

## 2023-03-02 ENCOUNTER — APPOINTMENT (OUTPATIENT)
Age: 38
End: 2023-03-02

## 2023-03-02 ENCOUNTER — NON-APPOINTMENT (OUTPATIENT)
Age: 38
End: 2023-03-02

## 2023-12-22 NOTE — CONSULT NOTE ADULT - CONSULT REQUESTED DATE/TIME
Spoke with patient who was able to provide acceptable patient identifiers prior to start of conversation.   Spoke to patient about rescheduling biopsy.  Patient would like a call back after the new year to reschedule second to reported infection and wound vac.  Will contact patient after Jan1.  
23-Apr-2021 13:12

## 2024-05-10 NOTE — ED ADULT NURSE NOTE - IS THE PATIENT ABLE TO BE SCREENED?
Return to ED if any worsening symptoms or alarming changes occur.  Recommend stopping all cocaine use and discussing with your PCP   Yes

## 2024-11-06 NOTE — OB RN PATIENT PROFILE - NS PRO TDAP RECEIVED Y/N
Stable   TSH current  Rx levothyroxine 125 mcg daily    Orders:    Follow Up In Primary Care - Established; Future     yes...
